# Patient Record
Sex: MALE | Race: WHITE | NOT HISPANIC OR LATINO | Employment: FULL TIME | URBAN - METROPOLITAN AREA
[De-identification: names, ages, dates, MRNs, and addresses within clinical notes are randomized per-mention and may not be internally consistent; named-entity substitution may affect disease eponyms.]

---

## 2023-01-19 ENCOUNTER — OFFICE VISIT (OUTPATIENT)
Dept: INTERNAL MEDICINE | Facility: CLINIC | Age: 58
End: 2023-01-19
Payer: COMMERCIAL

## 2023-01-19 VITALS
SYSTOLIC BLOOD PRESSURE: 148 MMHG | WEIGHT: 213.8 LBS | BODY MASS INDEX: 29.93 KG/M2 | DIASTOLIC BLOOD PRESSURE: 94 MMHG | HEIGHT: 71 IN | OXYGEN SATURATION: 97 % | TEMPERATURE: 98.4 F | HEART RATE: 98 BPM

## 2023-01-19 DIAGNOSIS — Z00.00 PE (PHYSICAL EXAM), ANNUAL: Primary | ICD-10-CM

## 2023-01-19 DIAGNOSIS — M54.16 LUMBAR RADICULOPATHY: ICD-10-CM

## 2023-01-19 DIAGNOSIS — Z12.5 SCREENING FOR PROSTATE CANCER: ICD-10-CM

## 2023-01-19 DIAGNOSIS — L30.9 DERMATITIS: ICD-10-CM

## 2023-01-19 DIAGNOSIS — L21.9 SEBORRHEIC DERMATITIS: ICD-10-CM

## 2023-01-19 DIAGNOSIS — Z91.018 ALLERGY TO ALPHA-GAL: ICD-10-CM

## 2023-01-19 DIAGNOSIS — Z11.59 SCREENING FOR VIRAL DISEASE: ICD-10-CM

## 2023-01-19 DIAGNOSIS — Z12.11 SCREENING FOR COLON CANCER: ICD-10-CM

## 2023-01-19 DIAGNOSIS — I10 ESSENTIAL HYPERTENSION: ICD-10-CM

## 2023-01-19 PROCEDURE — 99386 PREV VISIT NEW AGE 40-64: CPT | Performed by: NURSE PRACTITIONER

## 2023-01-19 RX ORDER — LOSARTAN POTASSIUM 50 MG/1
50 TABLET ORAL DAILY
Qty: 30 TABLET | Refills: 1 | Status: SHIPPED | OUTPATIENT
Start: 2023-01-19 | End: 2023-02-10

## 2023-01-20 LAB
ALBUMIN SERPL-MCNC: 4.5 G/DL (ref 3.5–5.2)
ALBUMIN/GLOB SERPL: 1.7 G/DL
ALP SERPL-CCNC: 49 U/L (ref 39–117)
ALT SERPL-CCNC: 16 U/L (ref 1–41)
APPEARANCE UR: CLEAR
AST SERPL-CCNC: 19 U/L (ref 1–40)
BILIRUB SERPL-MCNC: 0.5 MG/DL (ref 0–1.2)
BILIRUB UR QL STRIP: NEGATIVE
BUN SERPL-MCNC: 8 MG/DL (ref 6–20)
BUN/CREAT SERPL: 8.6 (ref 7–25)
CALCIUM SERPL-MCNC: 10 MG/DL (ref 8.6–10.5)
CHLORIDE SERPL-SCNC: 103 MMOL/L (ref 98–107)
CHOLEST SERPL-MCNC: 223 MG/DL (ref 0–200)
CO2 SERPL-SCNC: 27.9 MMOL/L (ref 22–29)
COLOR UR: YELLOW
CREAT SERPL-MCNC: 0.93 MG/DL (ref 0.76–1.27)
EGFRCR SERPLBLD CKD-EPI 2021: 96.4 ML/MIN/1.73
ERYTHROCYTE [DISTWIDTH] IN BLOOD BY AUTOMATED COUNT: 12.4 % (ref 12.3–15.4)
GLOBULIN SER CALC-MCNC: 2.6 GM/DL
GLUCOSE SERPL-MCNC: 90 MG/DL (ref 65–99)
GLUCOSE UR QL STRIP: NEGATIVE
HCT VFR BLD AUTO: 44.9 % (ref 37.5–51)
HCV AB S/CO SERPL IA: <0.1 S/CO RATIO (ref 0–0.9)
HDLC SERPL-MCNC: 61 MG/DL (ref 40–60)
HGB BLD-MCNC: 15.3 G/DL (ref 13–17.7)
HGB UR QL STRIP: NEGATIVE
KETONES UR QL STRIP: ABNORMAL
LDLC SERPL CALC-MCNC: 142 MG/DL (ref 0–100)
LEUKOCYTE ESTERASE UR QL STRIP: NEGATIVE
MCH RBC QN AUTO: 31.9 PG (ref 26.6–33)
MCHC RBC AUTO-ENTMCNC: 34.1 G/DL (ref 31.5–35.7)
MCV RBC AUTO: 93.5 FL (ref 79–97)
NITRITE UR QL STRIP: NEGATIVE
PH UR STRIP: 7.5 [PH] (ref 5–8)
PLATELET # BLD AUTO: 333 10*3/MM3 (ref 140–450)
POTASSIUM SERPL-SCNC: 4.7 MMOL/L (ref 3.5–5.2)
PROT SERPL-MCNC: 7.1 G/DL (ref 6–8.5)
PROT UR QL STRIP: NEGATIVE
PSA SERPL-MCNC: 0.39 NG/ML (ref 0–4)
RBC # BLD AUTO: 4.8 10*6/MM3 (ref 4.14–5.8)
SODIUM SERPL-SCNC: 140 MMOL/L (ref 136–145)
SP GR UR STRIP: 1.01 (ref 1–1.03)
TRIGL SERPL-MCNC: 115 MG/DL (ref 0–150)
TSH SERPL DL<=0.005 MIU/L-ACNC: 3.27 UIU/ML (ref 0.27–4.2)
UROBILINOGEN UR STRIP-MCNC: ABNORMAL MG/DL
VLDLC SERPL CALC-MCNC: 20 MG/DL (ref 5–40)
WBC # BLD AUTO: 4.54 10*3/MM3 (ref 3.4–10.8)

## 2023-01-23 ENCOUNTER — TELEPHONE (OUTPATIENT)
Dept: INTERNAL MEDICINE | Facility: CLINIC | Age: 58
End: 2023-01-23

## 2023-01-23 NOTE — TELEPHONE ENCOUNTER
Caller: Edvin Carmen    Relationship: Self    Best call back number: [phone]    What medication are you requesting: SHAMPOO AND CREAM FOR PSORIASIS      What are your current symptoms: PSORIASIS      If a prescription is needed, what is your preferred pharmacy and phone number: [id number] - [place], [place] - [address] AT [address]AR [facility] & [address] [facility] [phone] [facility] [phone] FX     Additional notes:    MEDICATION WAS DISCUSSED AT 1/19/2023 APPOINTMENT

## 2023-01-25 NOTE — TELEPHONE ENCOUNTER
PATIENT CALLED TO CHECK ON THIS REQUEST, HE IS GETTING READY TO GO OUT OF TOWN NEXT WEEK AND WANTS TO GET THIS TAKEN CARE OF THIS WEEK. STATES THIS WAS SUPPOSED TO BE PRESCRIBED AT HIS APPOINTMENT BUT WAS NEVER SENT TO THE PHARMACY.    PLEASE ADVISE    CALLBACK: [phone]

## 2023-01-26 ENCOUNTER — TELEPHONE (OUTPATIENT)
Dept: INTERNAL MEDICINE | Facility: CLINIC | Age: 58
End: 2023-01-26
Payer: COMMERCIAL

## 2023-01-26 RX ORDER — CLOBETASOL PROPIONATE 0.5 MG/G
1 OINTMENT TOPICAL 2 TIMES DAILY
Qty: 30 G | Refills: 0 | Status: SHIPPED | OUTPATIENT
Start: 2023-01-26 | End: 2023-03-22 | Stop reason: SDUPTHER

## 2023-01-27 RX ORDER — KETOCONAZOLE 20 MG/G
CREAM TOPICAL
Qty: 60 G | Refills: 0 | Status: SHIPPED | OUTPATIENT
Start: 2023-01-27 | End: 2023-03-22

## 2023-01-31 RX ORDER — KETOCONAZOLE 20 MG/ML
SHAMPOO TOPICAL
Qty: 120 ML | Refills: 1 | Status: SHIPPED | OUTPATIENT
Start: 2023-01-31 | End: 2023-03-22

## 2023-02-05 PROBLEM — Z91.018 ALLERGY TO ALPHA-GAL: Chronic | Status: ACTIVE | Noted: 2023-02-05

## 2023-02-05 PROBLEM — L21.9 SEBORRHEIC DERMATITIS: Chronic | Status: ACTIVE | Noted: 2023-02-05

## 2023-02-05 PROBLEM — L30.9 DERMATITIS: Status: ACTIVE | Noted: 2023-02-05

## 2023-02-05 PROBLEM — Z91.018 ALLERGY TO ALPHA-GAL: Status: ACTIVE | Noted: 2023-02-05

## 2023-02-05 PROBLEM — L21.9 SEBORRHEIC DERMATITIS: Status: ACTIVE | Noted: 2023-02-05

## 2023-02-05 PROBLEM — I10 ESSENTIAL HYPERTENSION: Chronic | Status: ACTIVE | Noted: 2023-01-19

## 2023-02-05 PROBLEM — Z86.79 HISTORY OF PERICARDITIS: Status: ACTIVE | Noted: 2023-02-05

## 2023-02-06 NOTE — PROGRESS NOTES
Subjective   Edvin Carmen is a 57 y.o. male who is here to establish care and for a physical exam.    History of Present Illness  Patient presents to establish care. Previous medical history discussed with patient in details and reflected in problem list.  He c/o a recurrent rash along his hairline and on his face which is mainly pruritic but not painful.  He has a hx of chronic neck pain, evaluated by neurosurgery (Dr. Stuart) in 2016 due to right radicular sx, MRI lumbar spine obtained in 2016.       The following portions of the patient's history were reviewed and updated as appropriate: allergies, current medications, past social history and problem list.    Past Medical History:   Diagnosis Date   • Anxiety    • History of medical problems 01/01/2018    Pericarditis   • Kidney stone 01/01/2013   • Pericarditis 06/2019         Current Outpatient Medications:   •  clobetasol (TEMOVATE) 0.05 % ointment, Apply 1 application topically to the appropriate area as directed 2 (Two) Times a Day., Disp: 30 g, Rfl: 0  •  ketoconazole (NIZORAL) 2 % cream, Apply to face daily, Disp: 60 g, Rfl: 0  •  ketoconazole (NIZORAL) 2 % shampoo, Apply to scalp 5 minutes before rinsing, apply 2-3 times per week, Disp: 120 mL, Rfl: 1  •  losartan (Cozaar) 50 MG tablet, Take 1 tablet by mouth Daily., Disp: 30 tablet, Rfl: 1    No Known Allergies    Review of Systems   Constitutional: Negative for activity change, appetite change, chills, diaphoresis, fatigue, fever and unexpected weight change.   HENT: Positive for congestion and postnasal drip. Negative for dental problem, drooling, ear discharge, ear pain, facial swelling, hearing loss, mouth sores, nosebleeds, rhinorrhea, sinus pressure, sore throat, tinnitus and trouble swallowing.    Eyes: Negative for photophobia, pain, discharge, redness, itching and visual disturbance.   Respiratory: Negative for apnea, cough, choking, chest tightness, shortness of breath and wheezing.   "  Cardiovascular: Negative for chest pain, palpitations and leg swelling.        No orthopnea, PND, MARQUIS   Gastrointestinal: Negative for abdominal pain, blood in stool, constipation, diarrhea, nausea and vomiting.   Endocrine: Negative for cold intolerance, heat intolerance, polydipsia and polyuria.   Genitourinary: Negative for decreased urine volume, dysuria, enuresis, flank pain, frequency, hematuria and urgency.   Musculoskeletal: Positive for arthralgias and back pain. Negative for gait problem, joint swelling, myalgias, neck pain and neck stiffness.   Skin: Positive for rash. Negative for color change.        No hair changes, no nail changes   Allergic/Immunologic: Negative for environmental allergies, food allergies and immunocompromised state.   Neurological: Negative for dizziness, tremors, seizures, syncope, speech difficulty, weakness, light-headedness, numbness and headaches.   Hematological: Negative for adenopathy. Does not bruise/bleed easily.   Psychiatric/Behavioral: Negative for agitation, confusion, decreased concentration, dysphoric mood, sleep disturbance and suicidal ideas. The patient is not nervous/anxious.        Objective   Vitals:    01/19/23 1133   BP: 148/94   BP Location: Left arm   Patient Position: Sitting   Cuff Size: Adult   Pulse: 98   Temp: 98.4 °F (36.9 °C)   TempSrc: Temporal   SpO2: 97%   Weight: 97 kg (213 lb 12.8 oz)   Height: 180.3 cm (71\")     Physical Exam  Constitutional:       General: He is not in acute distress.     Appearance: Normal appearance. He is not diaphoretic.   HENT:      Head: Normocephalic and atraumatic.      Right Ear: Tympanic membrane, ear canal and external ear normal.      Left Ear: Tympanic membrane, ear canal and external ear normal.      Nose: Nose normal. No rhinorrhea.      Mouth/Throat:      Mouth: Mucous membranes are moist.      Pharynx: Oropharynx is clear.   Eyes:      General:         Right eye: No discharge.         Left eye: No discharge. "      Conjunctiva/sclera: Conjunctivae normal.   Cardiovascular:      Rate and Rhythm: Normal rate and regular rhythm.      Pulses: Normal pulses.      Heart sounds: Normal heart sounds.   Pulmonary:      Effort: Pulmonary effort is normal.      Breath sounds: Normal breath sounds.   Abdominal:      General: Bowel sounds are normal.      Tenderness: There is no abdominal tenderness.   Musculoskeletal:         General: No swelling or tenderness.      Cervical back: Normal range of motion.   Skin:     General: Skin is warm and dry.      Findings: Rash present.      Comments: Erythematous, scaly rash with plaques only hairline and throughout face   Neurological:      General: No focal deficit present.      Mental Status: He is alert and oriented to person, place, and time.   Psychiatric:         Mood and Affect: Mood normal.         Behavior: Behavior normal.         Judgment: Judgment normal.         Assessment & Plan   Diagnoses and all orders for this visit:    1. PE (physical exam), annual (Primary)  -     CBC (No Diff)  -     Comprehensive Metabolic Panel  -     Lipid Panel  -     TSH  -     Urinalysis With Microscopic If Indicated (No Culture) - Urine, Clean Catch    2. Essential hypertension  Assessment & Plan:  His BP is elevated in the office and has been consistently >140/90 over the past few weeks; begin losartan and continue to monitor BP.    Orders:  -     losartan (Cozaar) 50 MG tablet; Take 1 tablet by mouth Daily.  Dispense: 30 tablet; Refill: 1    3. Dermatitis  Assessment & Plan:  Rash along hairline due to scalp psoriasis vs. Seborrheic dermatitis, will treat with topical steroids and antifungals and re-evaluate at f/u appt (will need derm referral if sx do not improve which we discussed today, declined for now).    Orders:  -     clobetasol (TEMOVATE) 0.05 % ointment; Apply 1 application topically to the appropriate area as directed 2 (Two) Times a Day.  Dispense: 30 g; Refill: 0    4. Seborrheic  dermatitis  Assessment & Plan:  He is given a topical shampoo and cream to apply, re-evaluate at f/u appt.    Orders:  -     Discontinue: Ketoconazole 1 % shampoo; Apply to scalp 5 minutes before rinsing, apply 2-3 times per week  Dispense: 200 mL; Refill: 0  -     ketoconazole (NIZORAL) 2 % cream; Apply to face daily  Dispense: 60 g; Refill: 0  -     ketoconazole (NIZORAL) 2 % shampoo; Apply to scalp 5 minutes before rinsing, apply 2-3 times per week  Dispense: 120 mL; Refill: 1    5. Allergy to alpha-gal  Assessment & Plan:  Sx began dominique 18 months ago, controlled with diet; continue to monitor.      6. Lumbar radiculopathy  Assessment & Plan:  2016: MRI lumbar spine-+ bulging disc on the right side at L3-4, sx improved and are stable      7. Screening for prostate cancer  -     PSA Screen    8. Screening for colon cancer  -     Cologuard - Stool, Per Rectum; Future    9. Screening for viral disease  -     Cancel: Hepatitis C Antibody    Other orders  -     Hepatitis C Antibody      Risk assessment:  He has a family hx (mother and father) of CAD-check fasting labs today.  His Body mass index is 29.82 kg/m². - He remains active and tries to follow a low-fat, low-cholesterol diet.    Prevention:  Health Maintenance   Topic Date Due   • COLORECTAL CANCER SCREENING  Never done   • TDAP/TD VACCINES (1 - Tdap) Never done   • ZOSTER VACCINE (1 of 2) Never done   • COVID-19 Vaccine (4 - Booster for Pfizer series) 01/11/2022   • INFLUENZA VACCINE  Never done   • ANNUAL PHYSICAL  Never done   • HEPATITIS C SCREENING  Completed   • Pneumococcal Vaccine 0-64  Aged Out       Discussed healthy lifestyle choices such as maintaining a balanced diet low in carbohydrates and limiting caffeine and alcohol intake.  Recommended routine exercise for bone strength and cardiovascular health.

## 2023-02-06 NOTE — ASSESSMENT & PLAN NOTE
His BP is elevated in the office and has been consistently >140/90 over the past few weeks; begin losartan and continue to monitor BP.

## 2023-02-06 NOTE — ASSESSMENT & PLAN NOTE
Rash along hairline due to scalp psoriasis vs. Seborrheic dermatitis, will treat with topical steroids and antifungals and re-evaluate at f/u appt (will need derm referral if sx do not improve which we discussed today, declined for now).

## 2023-02-10 DIAGNOSIS — I10 ESSENTIAL HYPERTENSION: ICD-10-CM

## 2023-02-10 RX ORDER — LOSARTAN POTASSIUM 50 MG/1
TABLET ORAL
Qty: 30 TABLET | Refills: 1 | Status: SHIPPED | OUTPATIENT
Start: 2023-02-10 | End: 2023-03-22 | Stop reason: SDUPTHER

## 2023-03-09 DIAGNOSIS — I10 ESSENTIAL HYPERTENSION: ICD-10-CM

## 2023-03-09 RX ORDER — LOSARTAN POTASSIUM 50 MG/1
TABLET ORAL
Qty: 30 TABLET | Refills: 1 | OUTPATIENT
Start: 2023-03-09

## 2023-03-22 ENCOUNTER — OFFICE VISIT (OUTPATIENT)
Dept: INTERNAL MEDICINE | Facility: CLINIC | Age: 58
End: 2023-03-22
Payer: COMMERCIAL

## 2023-03-22 VITALS
HEART RATE: 101 BPM | HEIGHT: 71 IN | WEIGHT: 220.4 LBS | DIASTOLIC BLOOD PRESSURE: 90 MMHG | BODY MASS INDEX: 30.85 KG/M2 | TEMPERATURE: 98 F | SYSTOLIC BLOOD PRESSURE: 132 MMHG | OXYGEN SATURATION: 96 %

## 2023-03-22 DIAGNOSIS — I10 ESSENTIAL HYPERTENSION: Primary | ICD-10-CM

## 2023-03-22 DIAGNOSIS — L30.9 DERMATITIS: ICD-10-CM

## 2023-03-22 DIAGNOSIS — F41.9 ANXIETY: ICD-10-CM

## 2023-03-22 DIAGNOSIS — Z12.11 SCREENING FOR COLON CANCER: ICD-10-CM

## 2023-03-22 PROBLEM — L21.9 SEBORRHEIC DERMATITIS: Chronic | Status: RESOLVED | Noted: 2023-02-05 | Resolved: 2023-03-22

## 2023-03-22 PROCEDURE — 99214 OFFICE O/P EST MOD 30 MIN: CPT | Performed by: NURSE PRACTITIONER

## 2023-03-22 RX ORDER — SODIUM FLUORIDE 6 MG/ML
PASTE, DENTIFRICE DENTAL
COMMUNITY
Start: 2023-03-13

## 2023-03-22 RX ORDER — CLOBETASOL PROPIONATE 0.5 MG/G
1 OINTMENT TOPICAL 2 TIMES DAILY
Qty: 30 G | Refills: 0 | Status: SHIPPED | OUTPATIENT
Start: 2023-03-22

## 2023-03-22 RX ORDER — ESCITALOPRAM OXALATE 10 MG/1
10 TABLET ORAL DAILY
Qty: 30 TABLET | Refills: 1 | Status: SHIPPED | OUTPATIENT
Start: 2023-03-22 | End: 2023-04-19

## 2023-03-22 RX ORDER — LOSARTAN POTASSIUM 100 MG/1
100 TABLET ORAL DAILY
Qty: 90 TABLET | Refills: 1 | Status: SHIPPED | OUTPATIENT
Start: 2023-03-22

## 2023-04-09 PROBLEM — F41.9 ANXIETY: Chronic | Status: ACTIVE | Noted: 2023-04-09

## 2023-04-09 PROBLEM — F41.9 ANXIETY: Status: ACTIVE | Noted: 2023-04-09

## 2023-04-09 NOTE — PROGRESS NOTES
"Chief Complaint  Hypertension (2 month follow up), Anxiety, and Rash    Subjective        Edvin Carmen presents to [facility] for f/u regarding HTN, rash and anxiety.    History of Present Illness  He was started on losartan at his last visit which he is tolerating well. His BP has improved but remains mildly elevated (unsure what home readings have been).  His facial and scalp rash has improved with topical steroid cream but is recurrent.  He is also tolerating Escitalopram daily which has been helpful for mgmt of anxiety.      Objective   Vital Signs:  /90 (BP Location: Left arm, Patient Position: Sitting, Cuff Size: Adult)   Pulse 101   Temp 98 °F (36.7 °C) (Temporal)   Ht 180.3 cm (71\")   Wt 100 kg (220 lb 6.4 oz)   SpO2 96%   BMI 30.74 kg/m²   Estimated body mass index is 30.74 kg/m² as calculated from the following:    Height as of this encounter: 180.3 cm (71\").    Weight as of this encounter: 100 kg (220 lb 6.4 oz).             Physical Exam  Constitutional:       Appearance: He is well-developed. He is not ill-appearing.   HENT:      Head: Normocephalic.      Right Ear: Hearing, tympanic membrane and external ear normal.      Left Ear: Hearing, tympanic membrane and external ear normal.      Nose: Nose normal. No nasal deformity, mucosal edema or rhinorrhea.      Right Sinus: No maxillary sinus tenderness or frontal sinus tenderness.      Left Sinus: No maxillary sinus tenderness or frontal sinus tenderness.      Mouth/Throat:      Dentition: Normal dentition.   Eyes:      General: Lids are normal.         Right eye: No discharge.         Left eye: No discharge.      Conjunctiva/sclera: Conjunctivae normal.      Right eye: No exudate.     Left eye: No exudate.  Neck:      Thyroid: No thyroid mass or thyromegaly.      Vascular: No carotid bruit.      Trachea: Trachea normal.   Cardiovascular:      Rate and Rhythm: Regular rhythm.      Pulses: Normal pulses.      " Heart sounds: Normal heart sounds. No murmur heard.  Pulmonary:      Effort: No respiratory distress.      Breath sounds: Normal breath sounds. No decreased breath sounds, wheezing, rhonchi or rales.   Abdominal:      General: Bowel sounds are normal.      Palpations: Abdomen is soft.      Tenderness: There is no abdominal tenderness.   Musculoskeletal:      Cervical back: Normal range of motion. No edema.   Lymphadenopathy:      Head:      Right side of head: No submental, submandibular, tonsillar, preauricular, posterior auricular or occipital adenopathy.      Left side of head: No submental, submandibular, tonsillar, preauricular, posterior auricular or occipital adenopathy.   Skin:     General: Skin is warm and dry.      Findings: Rash present.      Nails: There is no clubbing.      Comments: There is an erythematous plaque along hairline and scalp   Neurological:      Mental Status: He is alert.   Psychiatric:         Behavior: Behavior is cooperative.        Result Review :  The following data was reviewed by: EMILY Kwok on 03/22/2023:  Common labs    Common Labs 1/19/23 1/19/23 1/19/23 1/19/23    1242 1242 1242 1242   Glucose  90     BUN  8     Creatinine  0.93     Sodium  140     Potassium  4.7     Chloride  103     Calcium  10.0     Total Protein  7.1     Albumin  4.5     Total Bilirubin  0.5     Alkaline Phosphatase  49     AST (SGOT)  19     ALT (SGPT)  16     WBC 4.54      Hemoglobin 15.3      Hematocrit 44.9      Platelets 333      Total Cholesterol   223 (A)    Triglycerides   115    HDL Cholesterol   61 (A)    LDL Cholesterol    142 (A)    PSA    0.389   (A) Abnormal value       Comments are available for some flowsheets but are not being displayed.                        Assessment and Plan   Diagnoses and all orders for this visit:    1. Essential hypertension (Primary)  Assessment & Plan:  His BP is improving but remains above goal of <135/85, will increase losartan from 50 mg to 100 mg  and continue to monitor.    Orders:  -     losartan (COZAAR) 100 MG tablet; Take 1 tablet by mouth Daily.  Dispense: 90 tablet; Refill: 1    2. Dermatitis  Assessment & Plan:  Rash is suggestive of psoriasis which has improved with topical cream but will refer to derm for further mgmt    Orders:  -     Ambulatory Referral to Dermatology  -     clobetasol (TEMOVATE) 0.05 % ointment; Apply 1 application topically to the appropriate area as directed 2 (Two) Times a Day.  Dispense: 30 g; Refill: 0    3. Anxiety  Assessment & Plan:  He is doing well with Lexapro daily which he will continue along with close monitoring.    Orders:  -     escitalopram (LEXAPRO) 10 MG tablet; Take 1 tablet by mouth Daily.  Dispense: 30 tablet; Refill: 1    4. Screening for colon cancer  -     Cologuard - Stool, Per Rectum; Future         Follow Up   Return in about 4 months (around 7/22/2023).  Patient was given instructions and counseling regarding his condition or for health maintenance advice. Please see specific information pulled into the AVS if appropriate.

## 2023-04-09 NOTE — ASSESSMENT & PLAN NOTE
His BP is improving but remains above goal of <135/85, will increase losartan from 50 mg to 100 mg and continue to monitor.

## 2023-04-09 NOTE — ASSESSMENT & PLAN NOTE
Rash is suggestive of psoriasis which has improved with topical cream but will refer to derm for further mgmt

## 2023-04-14 ENCOUNTER — TELEPHONE (OUTPATIENT)
Dept: INTERNAL MEDICINE | Facility: CLINIC | Age: 58
End: 2023-04-14

## 2023-04-14 NOTE — TELEPHONE ENCOUNTER
Caller: Catherine Pardo    Relationship: Emergency Contact    Best call back number:     What is the best time to reach you:     Who are you requesting to speak with (clinical staff, provider,  specific staff member):     Do you know the name of the person who called:     What was the call regarding: PATIENTS GUIDO KHALIL IS CALLING IN TO INFORM DR LANE THAT THAT PATIENT HAS BEEN HAVING PAIN AND SWELLING IN HIS LEFT FOOT FOR 10 DAYS NOW AND SHE WANTS TO KNOW IF DR LANE WANTS TO SEE THE PATIENT OF IF HE WILL NEED TO BE REFERRED TO SEE SOMEONE ELSE.     Do you require a callback: YES

## 2023-04-19 ENCOUNTER — OFFICE VISIT (OUTPATIENT)
Dept: INTERNAL MEDICINE | Facility: CLINIC | Age: 58
End: 2023-04-19
Payer: COMMERCIAL

## 2023-04-19 VITALS
TEMPERATURE: 97.9 F | HEIGHT: 71 IN | OXYGEN SATURATION: 93 % | SYSTOLIC BLOOD PRESSURE: 133 MMHG | BODY MASS INDEX: 30.18 KG/M2 | WEIGHT: 215.6 LBS | HEART RATE: 106 BPM | DIASTOLIC BLOOD PRESSURE: 88 MMHG

## 2023-04-19 DIAGNOSIS — I10 ESSENTIAL HYPERTENSION: Chronic | ICD-10-CM

## 2023-04-19 DIAGNOSIS — M79.672 LEFT FOOT PAIN: Primary | ICD-10-CM

## 2023-04-19 PROCEDURE — 99213 OFFICE O/P EST LOW 20 MIN: CPT | Performed by: NURSE PRACTITIONER

## 2023-04-20 ENCOUNTER — HOSPITAL ENCOUNTER (OUTPATIENT)
Dept: GENERAL RADIOLOGY | Facility: HOSPITAL | Age: 58
Discharge: HOME | End: 2023-04-20
Payer: COMMERCIAL

## 2023-04-20 PROCEDURE — 73630 X-RAY EXAM OF FOOT: CPT

## 2023-05-04 ENCOUNTER — TELEPHONE (OUTPATIENT)
Dept: INTERNAL MEDICINE | Facility: CLINIC | Age: 58
End: 2023-05-04

## 2023-05-04 NOTE — TELEPHONE ENCOUNTER
Caller: Edvin Carmen    Relationship: Self    Best call back number:     What is the best time to reach you:     Who are you requesting to speak with (clinical staff, provider,  specific staff member): DR LANE    Do you know the name of the person who called:     What was the call regarding: PATIENT THINKS HE HAS A STRESS FRACTURE IN HIS LEFT FOOT AND HE WANTS TO BE CALLED TO DISCUSS.    Do you require a callback: YES

## 2023-05-08 PROBLEM — M79.672 LEFT FOOT PAIN: Status: ACTIVE | Noted: 2023-05-08

## 2023-05-08 RX ORDER — CLOBETASOL PROPIONATE 0.46 MG/ML
SOLUTION TOPICAL
COMMUNITY
Start: 2023-05-02

## 2023-05-08 NOTE — ASSESSMENT & PLAN NOTE
Losartan dose increased at 3/22/23 visit, home BP has improved-slightly elevated today. Continue medication along with a low sodium diet and close monitoring.

## 2023-05-08 NOTE — PROGRESS NOTES
"Chief Complaint  Foot Swelling    Subjective        Edvin Carmen presents to [facility] due to left foot pain.    History of Present Illness  He presents due to left midfoot pain which began approximately 2 weeks ago.  He denies acute injury or trauma.  He states pain is worse with weightbearing activities and notes swelling at the end of the day.  He notes increased pain as he walks, pushing up on his toes.  Denies paresthesias.  He has tried to rest the area with little improvement in symptoms.  He did not start Lexapro as prescribed at his last visit.      Objective   Vital Signs:  /88 (BP Location: Left arm, Patient Position: Sitting, Cuff Size: Adult)   Pulse 106   Temp 97.9 °F (36.6 °C) (Oral)   Ht 180.3 cm (71\")   Wt 97.8 kg (215 lb 9.6 oz)   SpO2 93%   BMI 30.07 kg/m²   Estimated body mass index is 30.07 kg/m² as calculated from the following:    Height as of this encounter: 180.3 cm (71\").    Weight as of this encounter: 97.8 kg (215 lb 9.6 oz).             Physical Exam  Constitutional:       Appearance: He is well-developed. He is not ill-appearing.   HENT:      Head: Normocephalic.      Right Ear: Hearing, tympanic membrane and external ear normal.      Left Ear: Hearing, tympanic membrane and external ear normal.      Nose: Nose normal. No nasal deformity, mucosal edema or rhinorrhea.      Right Sinus: No maxillary sinus tenderness or frontal sinus tenderness.      Left Sinus: No maxillary sinus tenderness or frontal sinus tenderness.      Mouth/Throat:      Dentition: Normal dentition.   Eyes:      General: Lids are normal.         Right eye: No discharge.         Left eye: No discharge.      Conjunctiva/sclera: Conjunctivae normal.      Right eye: No exudate.     Left eye: No exudate.  Neck:      Thyroid: No thyroid mass or thyromegaly.      Vascular: No carotid bruit.      Trachea: Trachea normal.   Cardiovascular:      Rate and Rhythm: Regular rhythm.    "   Pulses: Normal pulses.      Heart sounds: Normal heart sounds. No murmur heard.  Pulmonary:      Effort: No respiratory distress.      Breath sounds: Normal breath sounds. No decreased breath sounds, wheezing, rhonchi or rales.   Abdominal:      General: Bowel sounds are normal.      Palpations: Abdomen is soft.      Tenderness: There is no abdominal tenderness.   Musculoskeletal:      Cervical back: Normal range of motion. No edema.   Feet:      Left foot:      Skin integrity: No erythema.      Comments: There is tenderness to palpitation of left midfoot with minimal swelling, no erythema  Lymphadenopathy:      Head:      Right side of head: No submental, submandibular, tonsillar, preauricular, posterior auricular or occipital adenopathy.      Left side of head: No submental, submandibular, tonsillar, preauricular, posterior auricular or occipital adenopathy.   Skin:     General: Skin is warm and dry.      Nails: There is no clubbing.   Neurological:      Mental Status: He is alert.   Psychiatric:         Behavior: Behavior is cooperative.        Result Review :  The following data was reviewed by: EMILY Kwok on 04/19/2023:  Common labs        1/19/2023    12:42   Common Labs   Glucose 90     BUN 8     Creatinine 0.93     Sodium 140     Potassium 4.7     Chloride 103     Calcium 10.0     Total Protein 7.1     Albumin 4.5     Total Bilirubin 0.5     Alkaline Phosphatase 49     AST (SGOT) 19     ALT (SGPT) 16     WBC 4.54     Hemoglobin 15.3     Hematocrit 44.9     Platelets 333     Total Cholesterol 223     Triglycerides 115     HDL Cholesterol 61     LDL Cholesterol  142     PSA 0.389                    Assessment and Plan   Diagnoses and all orders for this visit:    1. Left foot pain (Primary)  Assessment & Plan:  No evidence of gout noted on exam, sx due to stress fracture? Will obtain xray and in the meantime begin Ibuprofen as needed for pain and inflammation.    Orders:  -     XR Foot 3+ View  Left    2. Essential hypertension  Assessment & Plan:  Losartan dose increased at 3/22/23 visit, home BP has improved-slightly elevated today. Continue medication along with a low sodium diet and close monitoring.             Follow Up   Return if symptoms worsen or fail to improve, for Next scheduled follow up.  Patient was given instructions and counseling regarding his condition or for health maintenance advice. Please see specific information pulled into the AVS if appropriate.

## 2023-05-18 DIAGNOSIS — Z12.11 SCREENING FOR COLON CANCER: ICD-10-CM

## 2023-09-06 DIAGNOSIS — I10 ESSENTIAL HYPERTENSION: ICD-10-CM

## 2023-09-07 RX ORDER — LOSARTAN POTASSIUM 100 MG/1
TABLET ORAL
Qty: 90 TABLET | Refills: 1 | Status: SHIPPED | OUTPATIENT
Start: 2023-09-07

## 2023-10-08 ENCOUNTER — HOSPITAL ENCOUNTER (EMERGENCY)
Facility: HOSPITAL | Age: 58
Discharge: HOME | End: 2023-10-08
Attending: EMERGENCY MEDICINE | Admitting: EMERGENCY MEDICINE
Payer: COMMERCIAL

## 2023-10-08 ENCOUNTER — APPOINTMENT (OUTPATIENT)
Dept: CT IMAGING | Facility: HOSPITAL | Age: 58
End: 2023-10-08
Payer: COMMERCIAL

## 2023-10-08 VITALS
BODY MASS INDEX: 30.78 KG/M2 | TEMPERATURE: 97.7 F | HEIGHT: 70 IN | RESPIRATION RATE: 15 BRPM | HEART RATE: 82 BPM | DIASTOLIC BLOOD PRESSURE: 111 MMHG | WEIGHT: 215 LBS | SYSTOLIC BLOOD PRESSURE: 157 MMHG | OXYGEN SATURATION: 94 %

## 2023-10-08 DIAGNOSIS — M54.41 ACUTE RIGHT-SIDED LOW BACK PAIN WITH RIGHT-SIDED SCIATICA: Primary | ICD-10-CM

## 2023-10-08 DIAGNOSIS — M51.36 LUMBAR DEGENERATIVE DISC DISEASE: ICD-10-CM

## 2023-10-08 PROCEDURE — 96372 THER/PROPH/DIAG INJ SC/IM: CPT

## 2023-10-08 PROCEDURE — 25010000002 HYDROMORPHONE 1 MG/ML SOLUTION: Performed by: EMERGENCY MEDICINE

## 2023-10-08 PROCEDURE — 63710000001 PREDNISONE PER 1 MG: Performed by: EMERGENCY MEDICINE

## 2023-10-08 PROCEDURE — 99284 EMERGENCY DEPT VISIT MOD MDM: CPT

## 2023-10-08 PROCEDURE — 72131 CT LUMBAR SPINE W/O DYE: CPT

## 2023-10-08 RX ORDER — METAXALONE 800 MG/1
800 TABLET ORAL 3 TIMES DAILY
Qty: 15 TABLET | Refills: 0 | Status: SHIPPED | OUTPATIENT
Start: 2023-10-08

## 2023-10-08 RX ORDER — METHYLPREDNISOLONE 4 MG/1
TABLET ORAL
Qty: 21 EACH | Refills: 0 | Status: SHIPPED | OUTPATIENT
Start: 2023-10-08

## 2023-10-08 RX ORDER — PREDNISONE 20 MG/1
60 TABLET ORAL ONCE
Status: COMPLETED | OUTPATIENT
Start: 2023-10-08 | End: 2023-10-08

## 2023-10-08 RX ORDER — NAPROXEN 500 MG/1
500 TABLET ORAL 2 TIMES DAILY PRN
Qty: 20 TABLET | Refills: 0 | Status: SHIPPED | OUTPATIENT
Start: 2023-10-08

## 2023-10-08 RX ORDER — HYDROCODONE BITARTRATE AND ACETAMINOPHEN 5; 325 MG/1; MG/1
1-2 TABLET ORAL EVERY 6 HOURS PRN
Qty: 15 TABLET | Refills: 0 | Status: SHIPPED | OUTPATIENT
Start: 2023-10-08 | End: 2023-10-13 | Stop reason: SDUPTHER

## 2023-10-08 RX ORDER — CYCLOBENZAPRINE HCL 10 MG
10 TABLET ORAL ONCE
Status: COMPLETED | OUTPATIENT
Start: 2023-10-08 | End: 2023-10-08

## 2023-10-08 RX ORDER — HYDROCODONE BITARTRATE AND ACETAMINOPHEN 7.5; 325 MG/1; MG/1
1 TABLET ORAL ONCE
Status: COMPLETED | OUTPATIENT
Start: 2023-10-08 | End: 2023-10-08

## 2023-10-08 RX ADMIN — HYDROMORPHONE HYDROCHLORIDE 1 MG: 1 INJECTION, SOLUTION INTRAMUSCULAR; INTRAVENOUS; SUBCUTANEOUS at 15:36

## 2023-10-08 RX ADMIN — CYCLOBENZAPRINE 10 MG: 10 TABLET, FILM COATED ORAL at 15:38

## 2023-10-08 RX ADMIN — PREDNISONE 60 MG: 20 TABLET ORAL at 15:39

## 2023-10-08 RX ADMIN — HYDROCODONE BITARTRATE AND ACETAMINOPHEN 1 TABLET: 7.5; 325 TABLET ORAL at 17:36

## 2023-10-08 NOTE — DISCHARGE INSTRUCTIONS
Take medications as prescribed.  Follow-up with your primary care provider soon as possible.  If symptoms persist, you may need an MRI of your lumbar spine.  Return to the emergency department for worsening pain, numbness/tingling/weakness in legs, loss of bowel/bladder control, numbness in your groin/pelvic area, fever, or other concern.

## 2023-10-08 NOTE — ED PROVIDER NOTES
EMERGENCY DEPARTMENT ENCOUNTER    Room Number:  24/24  PCP: Marylu Gerber APRN  Historian: Patient, spouse, EMS      HPI:  Chief Complaint: Low back pain  A complete HPI/ROS/PMH/PSH/SH/FH are unobtainable due to: Nothing  Context: Edvin Carmen is a 57 y.o. male who presents to the ED from home by EMS c/o right lower back pain.  Pain began gradually several days ago.  Pain has been worse since last night.  Pain radiates into the anterior right thigh.  Pain is worse with movement and bending.  Patient has been moving recently and has been carrying a lot of boxes.  However, he does not recall any specific injury.  Denies leg weakness, loss of bowel/bladder control, saddle anesthesia, or prior back surgery.  He had a similar episode of back pain several years ago.  Denies recent illness, cough, fever, chest pain, shortness of breath, flank pain, dysuria, or hematuria.            PAST MEDICAL HISTORY  Active Ambulatory Problems     Diagnosis Date Noted    Lumbar radiculopathy 10/20/2016    Essential hypertension 01/19/2023    Dermatitis 02/05/2023    Allergy to alpha-gal 02/05/2023    History of pericarditis 02/05/2023    Anxiety 04/09/2023    Left foot pain 05/08/2023     Resolved Ambulatory Problems     Diagnosis Date Noted    Seborrheic dermatitis 02/05/2023     Past Medical History:   Diagnosis Date    History of medical problems 01/01/2018    Kidney stone 01/01/2013    Pericarditis 06/2019         PAST SURGICAL HISTORY  History reviewed. No pertinent surgical history.      FAMILY HISTORY  Family History   Problem Relation Age of Onset    Heart attack Mother 78    Dementia Mother     Heart disease Mother     Coronary artery disease Father 70        +smoker    Heart disease Father          SOCIAL HISTORY  Social History     Socioeconomic History    Marital status:     Number of children: 2    Years of education: PTFA   Tobacco Use    Smoking status: Some Days     Packs/day: 0.00     Years: 0.00      Additional pack years: 0.00     Total pack years: 0.00     Types: Cigarettes    Smokeless tobacco: Never   Substance and Sexual Activity    Alcohol use: Not Currently    Drug use: No    Sexual activity: Yes     Partners: Female     Birth control/protection: I.U.D.         ALLERGIES  Patient has no known allergies.    REVIEW OF SYSTEMS  All systems have been reviewed and are negative except for those listed in the HPI      PHYSICAL EXAM  ED Triage Vitals [10/08/23 1318]   Temp Heart Rate Resp BP SpO2   97.7 øF (36.5 øC) 62 16 (!) 166/102 96 %      Temp src Heart Rate Source Patient Position BP Location FiO2 (%)   -- -- -- -- --       Physical Exam      GENERAL: Awake, alert, oriented x3.  Well-developed, well-nourished male.  No acute distress  HENT: NCAT, nares patent  EYES: no scleral icterus  CV: regular rhythm, normal rate, equal pedal pulses bilaterally  RESPIRATORY: normal effort, clear to auscultation bilaterally  ABDOMEN: soft, nondistended, nontender  MUSCULOSKELETAL: There is tenderness over the right SI joint and right lumbar paraspinal muscles.  T and L-spine are nontender.  Extremities are nontender with full range of motion  NEURO: Speech is normal.  No facial droop.  Normal and equal strength with bilateral hip flexion/extension, knee flexion/extension, ankle dorsiflexion/plantarflexion, and extension of the great toes.  Normal light touch sensation in all extremities.  No saddle anesthesia.  Straight leg raise negative bilaterally.  PSYCH:  calm, cooperative  SKIN: warm, dry    Vital signs and nursing notes reviewed.          LAB RESULTS  No results found for this or any previous visit (from the past 24 hour(s)).    Ordered the above labs and reviewed the results.        RADIOLOGY  CT Lumbar Spine Without Contrast    Result Date: 10/8/2023  CT LUMBAR SPINE WITHOUT CONTRAST  HISTORY: Back pain, right radiculopathy.  COMPARISON: None.  FINDINGS: There is moderate-to-severe loss of disc height at L5-S1.  Vacuum disc effect is noted at L4-L5. Small Schmorl's nodes are appreciated involving the superior endplates from T12 to L2. There is no evidence of fracture.  L1-L2: Mild broad-based disc osteophyte complex is present with no evidence of herniation.  L2-L3: Mild central broad-based disc osteophyte complex is present with no evidence of herniation.  L3-L4: There is mild-to-moderate canal stenosis secondary to a broad-based central disc osteophyte complex which also contributes to lateral recess narrowing bilaterally and to mild and mild-to-moderate foraminal stenosis on the left and right respectively.  L4-L5: There is mild canal stenosis and lateral recess narrowing bilaterally secondary to a broad-based disc osteophyte complex. Mild foraminal stenosis is present bilaterally secondary to extension of a small disc osteophyte complex into the neural foramen.  L5-S1: Mild central disc osteophyte complex is present with no evidence of herniation. There is mild and moderate foraminal stenosis on the right and left respectively. Transitional anatomy is appreciated consisting of partial sacralization of L5.      1.  Transitional anatomy is appreciated consisting of partial sacralization of L5. Careful correlation prior to any intervention is required given the presence of transitional anatomy. 2.  Multilevel degenerative disease involving the lumbar spine is noted including mild-to-moderate canal stenosis at L3-L4. There is lateral recess narrowing bilaterally at L3-L4 which potentially may involve the traversing L4 nerve roots. Spinal stenosis and lateral recess narrowing at L4-L5 is slightly less prominent. Multilevel foraminal stenosis is appreciated as described above most prominent of which is at L5-S1 where there is moderate foraminal stenosis on the left secondary to loss of disc height and extension of disc osteophyte complex into the neural foramen. Small Schmorl's nodes are appreciated. Further evaluation could  be performed with a MRI examination of the lumbar spine as indicated.    Radiation dose reduction techniques were utilized, including automated exposure control and exposure modulation based on body size.   This report was finalized on 10/8/2023 4:17 PM by Dr. Aung Escudero M.D on Workstation: [employer]       Ordered the above noted radiological studies. Reviewed by me in PACS.            PROCEDURES  Procedures              MEDICATIONS GIVEN IN ER  Medications   predniSONE (DELTASONE) tablet 60 mg (60 mg Oral Given 10/8/23 1539)   cyclobenzaprine (FLEXERIL) tablet 10 mg (10 mg Oral Given 10/8/23 1538)   HYDROmorphone (DILAUDID) injection 1 mg (1 mg Intramuscular Given 10/8/23 1536)   HYDROcodone-acetaminophen (NORCO) 7.5-325 MG per tablet 1 tablet (1 tablet Oral Given 10/8/23 7946)                   MEDICAL DECISION MAKING, PROGRESS, and CONSULTS    All labs have been independently reviewed by me.  All radiology studies have been reviewed by me and I have also reviewed the radiology report.   EKG's independently viewed and interpreted by me.  Discussion below represents my analysis of pertinent findings related to patient's condition, differential diagnosis, treatment plan and final disposition.      Additional sources:  - Discussed/ obtained information from independent historians: Spouse, EMS    - External (non-ED) record review: Patient saw his PCP in April 2023 for left foot pain and swelling.  He was admitted to [facility] in April 2019 for chest pain and acute pericarditis.    - Chronic or social conditions impacting care: None          Orders placed during this visit:  Orders Placed This Encounter   Procedures    CT Lumbar Spine Without Contrast         Additional orders considered but not ordered:  MRI of the lumbar spine: Patient does not have any neurological deficits.  Pain improved with p.o./IM medications.  Have low suspicion for cauda equina syndrome        Differential  diagnosis:  Sciatica, lumbar radiculopathy, degenerative disc disease, cauda equina syndrome, lumbar strain        Independent interpretation of labs, radiology studies, and discussions with consultants:  ED Course as of 10/08/23 1826   Sun Oct 08, 2023   1354 CT lumbar spine personally interpreted by me.  My personal interpretation is: Normal alignment.  No compression deformity.  Per the radiologist, there is multilevel degenerative disease.  There is moderate canal stenosis at L3/4.  There is lateral recess narrowing bilaterally at L3/4 which may involve the traversing L4 nerve roots.  There is multilevel foraminal stenosis most prominent at L5/S1 where there is moderate foraminal stenosis on the left.  See dictated report for details. [WH]   1640 Test results discussed with the patient and his wife.  He is feeling better after prednisone, Flexeril, and IM Dilaudid.  He is able to stand and ambulate without assistance.  He will be discharged with prescriptions for Medrol Dosepak, Skelaxin, Naprosyn, and a short course of pain medication.  He was advised to follow-up with his PCP and the possibility of needing an MRI if his symptoms persist.  Return precautions were discussed. [WH]   1645 [facility] query complete. Treatment plan to include limited course of prescribed  controlled substance. Risks including addiction, benefits, and alternatives presented to patient.    [WH]   1701 MDM: Patient presented to ED complaint of right lower back pain radiating into his right thigh.  Neuro exam was normal.  CT scan showed multilevel degenerative changes with foraminal stenosis and spinal stenosis.  His pain improved with prednisone, Flexeril, and Dilaudid.  Plan is for symptomatic treatment with NSAIDs, muscle relaxer, Medrol Dosepak, and a short course of pain medication.  Patient may ultimately need an MRI if his symptoms persist. [WH]      ED Course User Index  [WH] Jean Vieira MD               DIAGNOSIS  Final  diagnoses:   Acute right-sided low back pain with right-sided sciatica   Lumbar degenerative disc disease         DISPOSITION  DISCHARGE    Patient discharged in stable condition.    Reviewed implications of results, diagnosis, meds, responsibility to follow up, warning signs and symptoms of possible worsening, potential complications and reasons to return to ER, including worsening/persistent pain, numbness/tingling/weakness in legs, loss of bowel/bladder control, saddle anesthesia, fever, or other concern.    Patient/Family voiced understanding of above instructions.    Discussed plan for discharge, as there is no emergent indication for admission. Patient referred to primary care provider for BP management due to today's BP. Pt/family is agreeable and understands need for follow up and repeat testing.  Pt is aware that discharge does not mean that nothing is wrong but it indicates no emergency is present that requires admission and they must continue care with follow-up as given below or physician of their choice.     FOLLOW-UP  Marylu Gerber, APRN  [address] [id number] [phone]    Schedule an appointment as soon as possible for a visit            Medication List        New Prescriptions      HYDROcodone-acetaminophen 5-325 MG per tablet  Commonly known as: NORCO  Take 1-2 tablets by mouth Every 6 (Six) Hours As Needed for Moderate Pain.     metaxalone 800 MG tablet  Commonly known as: SKELAXIN  Take 1 tablet by mouth 3 (Three) Times a Day.     methylPREDNISolone 4 MG dose pack  Commonly known as: MEDROL  Take as directed on package instructions.     naproxen 500 MG EC tablet  Commonly known as: EC NAPROSYN  Take 1 tablet by mouth 2 (Two) Times a Day As Needed for Mild Pain.               Where to Get Your Medications        These medications were sent to [facility]/pharmacy #[id number] - [place], [place] - [address] IN THE [place] - [phone] [facility] [phone]   [address] [facility] [address]      Hours: 24-hours Phone: [phone]   HYDROcodone-acetaminophen 5-325 MG per tablet  metaxalone 800 MG tablet  methylPREDNISolone 4 MG dose pack  naproxen 500 MG EC tablet                   Latest Documented Vital Signs:  As of 18:26 EDT  BP- (!) 157/111 HR- 82 Temp- 97.7 øF (36.5 øC) O2 sat- 94%      SUZETTE reviewed by Jean Vieira MD       --    Please note that portions of this were completed with a voice recognition program.       Note Disclaimer: At [facility], we believe that sharing information builds trust and better relationships. You are receiving this note because you are receiving care at [facility] or recently visited. It is possible you will see health information before a provider has talked with you about it. This kind of information can be easy to misunderstand. To help you fully understand what it means for your health, we urge you to discuss this note with your provider.             Jean Vieira MD  10/08/23 4799

## 2023-10-09 ENCOUNTER — TELEPHONE (OUTPATIENT)
Dept: INTERNAL MEDICINE | Facility: CLINIC | Age: 58
End: 2023-10-09

## 2023-10-09 NOTE — TELEPHONE ENCOUNTER
Hub staff attempted to follow warm transfer process and was unsuccessful     Caller: Edvin Carmen    Relationship to patient: Self    Best call back number: [phone]    Patient is needing: PATIENT WAS IN THE EMERGENCY ROOM ON 10/8/23 AT [facility] FOR LOWER BACK PAIN.     PATIENT WAS GIVEN MEDICATIONS IN THE EMERGENCY ROOM AND WAS TOLD TO GET SOME FOLLOW UP TESTING DONE.     PATIENT IS REQUESTING A FOLLOW UP TO GET MEDICATIONS REFILLED FROM THE ER AND TO GET ORDERS FOR THOSE FOLLOW UP TESTS.     PLEASE ADVISE AND SCHEDULE.

## 2023-10-10 ENCOUNTER — TELEPHONE (OUTPATIENT)
Dept: INTERNAL MEDICINE | Facility: CLINIC | Age: 58
End: 2023-10-10
Payer: COMMERCIAL

## 2023-10-10 NOTE — TELEPHONE ENCOUNTER
Spoke with patient - worked in for video visit next day 10/11 at 12:45. Sent to eddie to schedule.

## 2023-10-10 NOTE — TELEPHONE ENCOUNTER
Patient's wife's called and is asking for medical advise.  Patient is on the floor and can not get up.  Patient legs do not work and in a lot of pain.  Per Avani, told spouse to call 911 and wife states need to speak with Avani/ Marylu for medical advise.  Please advise

## 2023-10-11 ENCOUNTER — TELEMEDICINE (OUTPATIENT)
Dept: INTERNAL MEDICINE | Facility: CLINIC | Age: 58
End: 2023-10-11
Payer: COMMERCIAL

## 2023-10-11 ENCOUNTER — TELEPHONE (OUTPATIENT)
Dept: INTERNAL MEDICINE | Facility: CLINIC | Age: 58
End: 2023-10-11

## 2023-10-11 DIAGNOSIS — M54.42 ACUTE RIGHT-SIDED LOW BACK PAIN WITH LEFT-SIDED SCIATICA: Primary | ICD-10-CM

## 2023-10-11 PROCEDURE — 99213 OFFICE O/P EST LOW 20 MIN: CPT | Performed by: NURSE PRACTITIONER

## 2023-10-11 NOTE — TELEPHONE ENCOUNTER
Caller: Catherine Pardo    Relationship to patient: Emergency Contact    Best call back number: [phone]    Patient is needing: PATIENT'S VISIT WAS SCHEDULED AS AN IN OFFICE VISIT    PATIENT CALLED AT 12:47 ON 10/11 AND SAID HE DID NOT REALIZE HE WAS DOWN FOR AN IN OFFICE VISIT    PATIENT WANTS TO SEE IF THIS CAN BE RESCHEDULED FOR A MYCHART VISIT. HE SAID THAT HE HAD ALREADY PAID ONLINE FOR THIS VISIT     REQUESTED CALLBACK

## 2023-10-11 NOTE — TELEPHONE ENCOUNTER
Caller: Edvin Carmen    Relationship to patient: Self    Best call back number: [phone]    Chief complaint:     Type of visit: SCHEDULED AS IN OFFICE, NEEDS TO BE MY CHART     Requested date: 10-11-23 12:45     If rescheduling, when is the original appointment:      Additional notes:PATIENT STATES HIS WIFE CALLED YESTERDAY AND WAS TOLD THIS COULD BE CHANGED TO VIDEO VISIT.   PLEASE ADVISE

## 2023-10-13 DIAGNOSIS — M54.41 ACUTE RIGHT-SIDED LOW BACK PAIN WITH RIGHT-SIDED SCIATICA: ICD-10-CM

## 2023-10-13 DIAGNOSIS — M51.36 LUMBAR DEGENERATIVE DISC DISEASE: ICD-10-CM

## 2023-10-13 RX ORDER — HYDROCODONE BITARTRATE AND ACETAMINOPHEN 5; 325 MG/1; MG/1
1-2 TABLET ORAL EVERY 6 HOURS PRN
Qty: 30 TABLET | Refills: 0 | Status: SHIPPED | OUTPATIENT
Start: 2023-10-13

## 2023-10-13 NOTE — TELEPHONE ENCOUNTER
Caller: Edvin Carmen    Relationship: Self    Best call back number: [phone]     Requested Prescriptions:   Requested Prescriptions     Pending Prescriptions Disp Refills    HYDROcodone-acetaminophen (NORCO) 5-325 MG per tablet 15 tablet 0     Sig: Take 1-2 tablets by mouth Every 6 (Six) Hours As Needed for Moderate Pain.        Pharmacy where request should be sent: [facility]/PHARMACY #[id number] - [place], [place] - [address] IN [facility] [phone] [facility] [phone] FX     Last office visit with prescribing clinician: 4/19/2023   Last telemedicine visit with prescribing clinician: Visit date not found   Next office visit with prescribing clinician: Visit date not found     Additional details provided by patient: PATIENT STATES THAT HE WAS PRESCRIBED THE MEDICATION AT THE ER FOR A BACK INJURY. HE IS GETTING BETTER, BUT STILL HAS SOME PAIN. HE CURRENTLY HAS 1 TABLET REMAINING    Does the patient have less than a 3 day supply:  [x] Yes  [] No    Would you like a call back once the refill request has been completed: [] Yes [x] No    If the office needs to give you a call back, can they leave a voicemail: [] Yes [x] No    Joselo Gary Rep   10/13/23 11:00 EDT

## 2023-10-13 NOTE — TELEPHONE ENCOUNTER
TOMMY    Attempted to call patient to schedule 6 month follow up, voicemail box was full. [employer] message sent    HUB TO READ  In order to receive controlled substances, you have to be seen every 6 months. We were calling to go ahead and get that appointment scheduled so we have it on file. Please schedule 6 month follow up with Marylu around April of 2024.    HUB  Please let us know once appt has been scheduled so we can process refill. Thank you!

## 2023-10-13 NOTE — TELEPHONE ENCOUNTER
Please advise - patient was prescribed these in the hospital and would like you to continue them    Last office visit with prescribing clinician: 10/11/2023    Next office visit with prescribing clinician: 4/1/2024

## 2023-10-13 NOTE — TELEPHONE ENCOUNTER
Caller: Edvin Carmen    Relationship: Self    Best call back number: [phone]     What was the call regarding: HUB RELAYED MESSAGE. PATIENT SCHEDULED FOR 4/1/24

## 2023-10-22 NOTE — PROGRESS NOTES
"Chief Complaint  Back Pain    Subjective        Edvin Carmen presents to [facility] due to low back pain.    He c/o increased low back pain which began dominique 10-14 days ago. He denies acute injury or trauma but has had been moving which he believes exacerbated symptoms. He was taken to the ER 10/8 by EMS due to decreased mobility. Pain radiates into the anterior right thigh. He denies weakness of lower extremities and/or change in bowel or bladder function. He had similar pain several years ago.    His CT lumbar spine 10/8 showed multilevel DDD with mild-to-moderate canal stenosis at L3-4. He was started on a Medrol Donovan and Skelaxin. He was also given Hydrocodone for breakthrough pain. He states pain is only mildly better. Pain is worse with prolonged sitting and/or standing.    You have chosen to receive care through a telehealth visit.  Do you consent to use a video/audio connection for your medical care today? Yes    Location of patient: home  Location of provider: [facility]  The visit included audio and video interaction between patient and provider due to the COVID-19 pandemic.    History of Present Illness    Objective   Vital Signs:  There were no vitals taken for this visit.  Estimated body mass index is 30.85 kg/m² as calculated from the following:    Height as of 10/8/23: 177.8 cm (70\").    Weight as of 10/8/23: 97.5 kg (215 lb).       BMI is within normal parameters. No other follow-up for BMI required.      Physical Exam  Constitutional:       Appearance: He is well-developed.   HENT:      Head: Normocephalic and atraumatic.   Eyes:      General:         Right eye: No discharge.         Left eye: No discharge.      Conjunctiva/sclera: Conjunctivae normal.   Pulmonary:      Effort: Pulmonary effort is normal.   Neurological:      Mental Status: He is alert and oriented to person, place, and time.   Psychiatric:         Behavior: Behavior normal.         Thought Content: " Thought content normal.         Judgment: Judgment normal.        Result Review :  The following data was reviewed by: EMILY Kwok on 10/11/2023:  Common labs          1/19/2023    12:42   Common Labs   Glucose 90    BUN 8    Creatinine 0.93    Sodium 140    Potassium 4.7    Chloride 103    Calcium 10.0    Total Protein 7.1    Albumin 4.5    Total Bilirubin 0.5    Alkaline Phosphatase 49    AST (SGOT) 19    ALT (SGPT) 16    WBC 4.54    Hemoglobin 15.3    Hematocrit 44.9    Platelets 333    Total Cholesterol 223    Triglycerides 115    HDL Cholesterol 61    LDL Cholesterol  142    PSA 0.389      Data reviewed : Radiologic studies CT lumbar spine 10/8/23           Assessment and Plan   Diagnoses and all orders for this visit:    1. Acute right-sided low back pain with left-sided sciatica (Primary)    He will complete the Medrol melba for inflammation, also has Rx for Naprosyn which he may take for inflammation once Medrol is complete. He may continue Hydrocodone for severe pain, will refill when medication is complete.    Consider MRI if pain does not improve.    History and medical judgement obtained from video conversation with patient. No hands-on physical examination was performed. Approximately 13 minutes spent in video conversation with patient and in chart review.         Follow Up   No follow-ups on file.  Patient was given instructions and counseling regarding his condition or for health maintenance advice. Please see specific information pulled into the AVS if appropriate.

## 2024-03-22 DIAGNOSIS — I10 ESSENTIAL HYPERTENSION: ICD-10-CM

## 2024-03-22 RX ORDER — LOSARTAN POTASSIUM 100 MG/1
TABLET ORAL
Qty: 90 TABLET | Refills: 0 | Status: SHIPPED | OUTPATIENT
Start: 2024-03-22

## 2024-04-01 ENCOUNTER — OFFICE VISIT (OUTPATIENT)
Dept: INTERNAL MEDICINE | Facility: CLINIC | Age: 59
End: 2024-04-01
Payer: COMMERCIAL

## 2024-04-01 VITALS
WEIGHT: 237.2 LBS | BODY MASS INDEX: 34.03 KG/M2 | DIASTOLIC BLOOD PRESSURE: 84 MMHG | SYSTOLIC BLOOD PRESSURE: 134 MMHG | HEART RATE: 94 BPM | OXYGEN SATURATION: 95 %

## 2024-04-01 DIAGNOSIS — Z12.5 SCREENING FOR PROSTATE CANCER: ICD-10-CM

## 2024-04-01 DIAGNOSIS — R10.13 DYSPEPSIA: ICD-10-CM

## 2024-04-01 DIAGNOSIS — Z00.00 PE (PHYSICAL EXAM), ANNUAL: Primary | ICD-10-CM

## 2024-04-01 DIAGNOSIS — I10 ESSENTIAL HYPERTENSION: Chronic | ICD-10-CM

## 2024-04-01 DIAGNOSIS — Z12.11 SCREENING FOR COLON CANCER: ICD-10-CM

## 2024-04-01 DIAGNOSIS — L40.9 PSORIASIS: ICD-10-CM

## 2024-04-01 PROBLEM — L30.9 DERMATITIS: Status: RESOLVED | Noted: 2023-02-05 | Resolved: 2024-04-01

## 2024-04-01 LAB
ALBUMIN SERPL-MCNC: 4.4 G/DL (ref 3.5–5.2)
ALBUMIN/GLOB SERPL: 1.5 G/DL
ALP SERPL-CCNC: 56 U/L (ref 39–117)
ALT SERPL-CCNC: 21 U/L (ref 1–41)
AST SERPL-CCNC: 15 U/L (ref 1–40)
BILIRUB SERPL-MCNC: 0.4 MG/DL (ref 0–1.2)
BUN SERPL-MCNC: 15 MG/DL (ref 6–20)
BUN/CREAT SERPL: 14.9 (ref 7–25)
CALCIUM SERPL-MCNC: 10 MG/DL (ref 8.6–10.5)
CHLORIDE SERPL-SCNC: 102 MMOL/L (ref 98–107)
CHOLEST SERPL-MCNC: 264 MG/DL (ref 0–200)
CO2 SERPL-SCNC: 24.3 MMOL/L (ref 22–29)
CREAT SERPL-MCNC: 1.01 MG/DL (ref 0.76–1.27)
EGFRCR SERPLBLD CKD-EPI 2021: 86.7 ML/MIN/1.73
ERYTHROCYTE [DISTWIDTH] IN BLOOD BY AUTOMATED COUNT: 12.4 % (ref 12.3–15.4)
GLOBULIN SER CALC-MCNC: 2.9 GM/DL
GLUCOSE SERPL-MCNC: 95 MG/DL (ref 65–99)
HCT VFR BLD AUTO: 43.9 % (ref 37.5–51)
HDLC SERPL-MCNC: 63 MG/DL (ref 40–60)
HGB BLD-MCNC: 14.9 G/DL (ref 13–17.7)
LDLC SERPL CALC-MCNC: 173 MG/DL (ref 0–100)
MCH RBC QN AUTO: 31 PG (ref 26.6–33)
MCHC RBC AUTO-ENTMCNC: 33.9 G/DL (ref 31.5–35.7)
MCV RBC AUTO: 91.3 FL (ref 79–97)
PLATELET # BLD AUTO: 316 10*3/MM3 (ref 140–450)
POTASSIUM SERPL-SCNC: 4.4 MMOL/L (ref 3.5–5.2)
PROT SERPL-MCNC: 7.3 G/DL (ref 6–8.5)
PSA SERPL-MCNC: 0.41 NG/ML (ref 0–4)
RBC # BLD AUTO: 4.81 10*6/MM3 (ref 4.14–5.8)
SODIUM SERPL-SCNC: 140 MMOL/L (ref 136–145)
TRIGL SERPL-MCNC: 157 MG/DL (ref 0–150)
TSH SERPL DL<=0.005 MIU/L-ACNC: 3.78 UIU/ML (ref 0.27–4.2)
VLDLC SERPL CALC-MCNC: 28 MG/DL (ref 5–40)
WBC # BLD AUTO: 5.52 10*3/MM3 (ref 3.4–10.8)

## 2024-04-01 RX ORDER — CLOBETASOL PROPIONATE 0.5 MG/G
1 OINTMENT TOPICAL 2 TIMES DAILY
Qty: 30 G | Refills: 0 | Status: SHIPPED | OUTPATIENT
Start: 2024-04-01

## 2024-04-01 RX ORDER — FAMOTIDINE 20 MG/1
20 TABLET, FILM COATED ORAL 2 TIMES DAILY
Qty: 30 TABLET | Refills: 5 | Status: SHIPPED | OUTPATIENT
Start: 2024-04-01

## 2024-04-01 RX ORDER — LOSARTAN POTASSIUM 100 MG/1
100 TABLET ORAL DAILY
Qty: 90 TABLET | Refills: 1 | Status: SHIPPED | OUTPATIENT
Start: 2024-04-01

## 2024-04-01 NOTE — ASSESSMENT & PLAN NOTE
Discussed triggering factors (including smoking), begin Pepcid 20 mg daily and monitor for worsening sx.

## 2024-04-01 NOTE — PROGRESS NOTES
Subjective   Edvin Carmen is a 58 y.o. male who is here for a physical exam.    History of Present Illness   History of Present Illness  The patient is a 58-year-old male who presents for evaluation of multiple medical concerns.    The patient reports an improvement in his stress levels compared to the previous year. 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 He expresses a desire to change his sedentary lifestyle, which has negatively impacted his weight. His current medication regimen includes losartan, which he takes at approximately 10 or 11:30 in the morning. He does not monitor his blood pressure at home, although he has previously owned a blood pressure cuff.    The patient's psoriasis has significantly improved, with approximately 10 percent reduction in symptoms on his forehead and arms. He has previously consulted a dermatologist who recommended Skyrizi, however, he did not start the medication due to improvement in sx. He is currently using clobetasol and is requesting a refill.    The patient experiences heartburn, which has increased in severity.  His dentist has noticed erosion of his teeth due to reflux. His symptoms have slightly worsened over the past year, although not severe. He has initiated Tums for this symptom.    The patient has recently started smoking, having been smoking for the past 6 months. Despite multiple attempts to quit, he has been unsuccessful due to intermittent life stressors. He is seeking advice on nicotine gum or other de-escalation strategies. His wife, who also smokes, does not smoke in the house.     The patient believes his Cologuard kit is about to . He has declined a colonoscopy.    Supplemental Information  He was bitten by a tick and had sx of Alpha Gal for dominique 18 months, sx have since improved.  He denies headaches, dizziness, lightheadedness, or shortness of breath. He sleeps well at night and does not get up much to go to the  bathroom.   The patient has no known allergies.      Past Medical History:   Diagnosis Date    Anxiety     History of medical problems 01/01/2018    Pericarditis    Kidney stone 01/01/2013    Pericarditis 06/2019         Current Outpatient Medications:     clobetasol (TEMOVATE) 0.05 % external solution, , Disp: , Rfl:     clobetasol (TEMOVATE) 0.05 % ointment, Apply 1 Application topically to the appropriate area as directed 2 (Two) Times a Day., Disp: 30 g, Rfl: 0    losartan (COZAAR) 100 MG tablet, Take 1 tablet by mouth Daily., Disp: 90 tablet, Rfl: 1    Sodium Fluoride 5000 PPM 1.1 % paste, PLEASE SEE ATTACHED FOR DETAILED DIRECTIONS, Disp: , Rfl:     famotidine (Pepcid) 20 MG tablet, Take 1 tablet by mouth 2 (Two) Times a Day., Disp: 30 tablet, Rfl: 5    No Known Allergies    Review of Systems   Constitutional:  Negative for activity change, appetite change, chills, diaphoresis, fatigue, fever and unexpected weight change.   HENT:  Negative for congestion, dental problem, drooling, ear discharge, ear pain, facial swelling, hearing loss, mouth sores, nosebleeds, postnasal drip, rhinorrhea, sinus pressure, sore throat, tinnitus and trouble swallowing.    Eyes:  Negative for photophobia, pain, discharge, redness, itching and visual disturbance.   Respiratory:  Negative for apnea, cough, choking, chest tightness, shortness of breath and wheezing.    Cardiovascular:  Negative for chest pain, palpitations and leg swelling.        No orthopnea, PND, MARQUIS   Gastrointestinal:  Negative for abdominal pain, blood in stool, constipation, diarrhea, nausea and vomiting.   Endocrine: Negative for cold intolerance, heat intolerance, polydipsia and polyuria.   Genitourinary:  Negative for decreased urine volume, dysuria, enuresis, flank pain, frequency, hematuria and urgency.   Musculoskeletal:  Negative for arthralgias, back pain, gait problem, joint swelling, myalgias, neck pain and neck stiffness.   Skin:  Negative for color  change and rash.        No hair changes, no nail changes   Allergic/Immunologic: Negative for environmental allergies, food allergies and immunocompromised state.   Neurological:  Negative for dizziness, tremors, seizures, syncope, speech difficulty, weakness, light-headedness, numbness and headaches.   Hematological:  Negative for adenopathy. Does not bruise/bleed easily.   Psychiatric/Behavioral:  Negative for agitation, confusion, decreased concentration, dysphoric mood, sleep disturbance and suicidal ideas. The patient is not nervous/anxious.        Objective   Vitals:    04/01/24 1119 04/01/24 1134   BP: 134/94 134/84   BP Location: Left arm Left arm   Patient Position: Sitting Sitting   Cuff Size: Large Adult Adult   Pulse: 94    SpO2: 95%    Weight: 108 kg (237 lb 3.2 oz)      Physical Exam  Constitutional:       General: He is not in acute distress.     Appearance: Normal appearance. He is not diaphoretic.   HENT:      Head: Normocephalic and atraumatic.      Right Ear: Tympanic membrane, ear canal and external ear normal.      Left Ear: Tympanic membrane, ear canal and external ear normal.      Nose: Nose normal. No rhinorrhea.      Mouth/Throat:      Mouth: Mucous membranes are moist.      Pharynx: Oropharynx is clear.   Eyes:      General:         Right eye: No discharge.         Left eye: No discharge.      Conjunctiva/sclera: Conjunctivae normal.   Cardiovascular:      Rate and Rhythm: Normal rate and regular rhythm.      Pulses: Normal pulses.      Heart sounds: Normal heart sounds.   Pulmonary:      Effort: Pulmonary effort is normal.      Breath sounds: Normal breath sounds.   Abdominal:      General: Bowel sounds are normal.      Tenderness: There is no abdominal tenderness.   Musculoskeletal:         General: No swelling or tenderness.      Cervical back: Normal range of motion.   Skin:     General: Skin is warm and dry.   Neurological:      General: No focal deficit present.      Mental Status: He  is alert and oriented to person, place, and time.   Psychiatric:         Mood and Affect: Mood normal.         Behavior: Behavior normal.         Judgment: Judgment normal.       Physical Exam  Vital Signs  The patient's blood pressure is 134/94. The patient's weight is 237.    Results         Assessment & Plan   Diagnoses and all orders for this visit:    1. PE (physical exam), annual (Primary)  -     CBC (No Diff)  -     Comprehensive Metabolic Panel  -     Lipid Panel  -     TSH    2. Psoriasis  Assessment & Plan:  He has seen dermatology and sx have improved with clobetasol cream, which he uses on PRN basis continue to monitor.    Orders:  -     clobetasol (TEMOVATE) 0.05 % ointment; Apply 1 Application topically to the appropriate area as directed 2 (Two) Times a Day.  Dispense: 30 g; Refill: 0    3. Essential hypertension  Assessment & Plan:  BP is mildly elevated in the office today, he will monitor at home and call with readings in 2-3 weeks. Continue losartan daily.    Orders:  -     losartan (COZAAR) 100 MG tablet; Take 1 tablet by mouth Daily.  Dispense: 90 tablet; Refill: 1    4. Dyspepsia  Assessment & Plan:  Discussed triggering factors (including smoking), begin Pepcid 20 mg daily and monitor for worsening sx.    Orders:  -     famotidine (Pepcid) 20 MG tablet; Take 1 tablet by mouth 2 (Two) Times a Day.  Dispense: 30 tablet; Refill: 5    5. Screening for colon cancer  -     Cologuard - Stool, Per Rectum; Future    6. Screening for prostate cancer  -     PSA Screen      Assessment & Plan  Psoriasis.  The patient was advised against the use of clobetasol on his face due to its potent properties and potential skin thinning. A prescription refill for clobetasol was provided.    2. Hypertension.  The patient's blood pressure was slightly elevated during this visit, potentially due to inadequate rest. The target blood pressure range is 120s to 130s over 70s to 80s. The patient's weight has slightly  increased, from 215 in 10/2023 to 237 today, which could potentially impact his blood pressure. The patient was advised to monitor his blood pressure while at rest and to report if it consistently remains high. A prescription refill for losartan was provided.    3. Heartburn.  The patient was advised to take Pepcid daily. Consider EGD if sx persist despite taking medication daily.    4. Smoking cessation.  The patient was educated about the potential benefits of nicotine gum to minimize nicotine withdrawal. The use of Chantix, nicotine patches, and Nicotrol inhaler were discussed. The patient was advised against vaping.    5. Colon cancer screening.  A Cologuard test was ordered.    Follow-up  The patient is scheduled for a follow-up visit in 6 months.    Risk Assessment:  Family History   Problem Relation Age of Onset    Heart attack Mother 78    Dementia Mother     Heart disease Mother     Coronary artery disease Father 70        +smoker    Heart disease Father      His Body mass index is 34.03 kg/m². He is encouraged to make lifestyle changes to help with weight loss.    Prevention:  Health Maintenance   Topic Date Due    COLORECTAL CANCER SCREENING  Never done    Pneumococcal Vaccine 0-64 (1 of 2 - PCV) Never done    TDAP/TD VACCINES (1 - Tdap) Never done    ZOSTER VACCINE (1 of 2) Never done    COVID-19 Vaccine (4 - 2023-24 season) 09/01/2023    ANNUAL PHYSICAL  01/19/2024    INFLUENZA VACCINE  08/01/2024    BMI FOLLOWUP  04/01/2025    HEPATITIS C SCREENING  Completed       Discussed healthy lifestyle choices such as maintaining a balanced diet low in carbohydrates and limiting caffeine and alcohol intake.  Recommended routine exercise for bone strength and cardiovascular health.         Patient or patient representative verbalized consent for the use of Ambient Listening during the visit with  EMILY Kwok for chart documentation. 4/1/2024  19:33 EDT

## 2024-04-01 NOTE — ASSESSMENT & PLAN NOTE
He has seen dermatology and sx have improved with clobetasol cream, which he uses on PRN basis continue to monitor.

## 2024-04-01 NOTE — PATIENT INSTRUCTIONS
Please monitor your blood pressure at home and call (or send message) with your blood pressure readings in 2-3 weeks.

## 2024-04-01 NOTE — ASSESSMENT & PLAN NOTE
BP is mildly elevated in the office today, he will monitor at home and call with readings in 2-3 weeks. Continue losartan daily.

## 2024-07-01 DIAGNOSIS — R10.13 DYSPEPSIA: ICD-10-CM

## 2024-07-01 RX ORDER — FAMOTIDINE 20 MG/1
20 TABLET, FILM COATED ORAL 2 TIMES DAILY
Qty: 180 TABLET | Refills: 1 | Status: SHIPPED | OUTPATIENT
Start: 2024-07-01

## 2024-08-23 ENCOUNTER — TELEPHONE (OUTPATIENT)
Dept: INTERNAL MEDICINE | Facility: CLINIC | Age: 59
End: 2024-08-23
Payer: COMMERCIAL

## 2024-10-03 ENCOUNTER — OFFICE VISIT (OUTPATIENT)
Dept: INTERNAL MEDICINE | Facility: CLINIC | Age: 59
End: 2024-10-03
Payer: COMMERCIAL

## 2024-10-03 VITALS
BODY MASS INDEX: 33.7 KG/M2 | HEART RATE: 80 BPM | HEIGHT: 70 IN | DIASTOLIC BLOOD PRESSURE: 88 MMHG | TEMPERATURE: 97.6 F | OXYGEN SATURATION: 96 % | SYSTOLIC BLOOD PRESSURE: 130 MMHG | WEIGHT: 235.4 LBS

## 2024-10-03 DIAGNOSIS — L40.9 PSORIASIS: ICD-10-CM

## 2024-10-03 DIAGNOSIS — E78.00 HYPERCHOLESTEREMIA: Chronic | ICD-10-CM

## 2024-10-03 DIAGNOSIS — I10 ESSENTIAL HYPERTENSION: Primary | Chronic | ICD-10-CM

## 2024-10-03 PROCEDURE — 99214 OFFICE O/P EST MOD 30 MIN: CPT | Performed by: NURSE PRACTITIONER

## 2024-10-03 RX ORDER — LOSARTAN POTASSIUM 100 MG/1
100 TABLET ORAL DAILY
Qty: 90 TABLET | Refills: 1 | Status: SHIPPED | OUTPATIENT
Start: 2024-10-03

## 2024-10-03 NOTE — ASSESSMENT & PLAN NOTE
The 10-year ASCVD risk score (Keon GARCIA, et al., 2019) is: 15.3%    Values used to calculate the score:      Age: 58 years      Sex: Male      Is Non- : No      Diabetic: No      Tobacco smoker: Yes      Systolic Blood Pressure: 130 mmHg      Is BP treated: Yes      HDL Cholesterol: 63 mg/dL      Total Cholesterol: 264 mg/dL  Previous blood work in April showed elevated cholesterol levels with a total cholesterol of 264 and LDL of 173. Dietary modifications, including the consumption of oatmeal and reduction of animal products, were recommended. Cholesterol levels will be rechecked during his physical in 6 months. Declined statin therapy.

## 2024-10-20 PROBLEM — E78.00 HYPERCHOLESTEREMIA: Chronic | Status: ACTIVE | Noted: 2024-10-03

## 2024-10-20 NOTE — PROGRESS NOTES
"Chief Complaint  Hypertension (6 month follow up)  Subjective        Edvin Carmen presents to [facility]  History of Present Illness  History of Present Illness  The patient presents for evaluation of multiple medical concerns.    He is currently on a regimen of losartan 100 mg for blood pressure management. He has not received the influenza vaccine. His bowel movements are regular, and he reports no issues with abdominal pain and/or rectal bleeding. He has recently started taking famotidine, which seems to be effective.    His psoriasis is currently under control. He has been using clobetasol, a topical steroid, for treatment intermittently.    He does report not watching his diet as closely over the past several months with restarting smoking as well. Denies dyspnea.    Supplemental Information  He has some pain in his left hip, but when he bikes it goes away.    IMMUNIZATIONS  He has received COVID-19 vaccines in the past.    Objective   Vital Signs:  /88 (BP Location: Left arm, Patient Position: Sitting, Cuff Size: Large Adult)   Pulse 80   Temp 97.6 °F (36.4 °C)   Ht 177.8 cm (70\")   Wt 107 kg (235 lb 6.4 oz)   SpO2 96%   BMI 33.78 kg/m²   Estimated body mass index is 33.78 kg/m² as calculated from the following:    Height as of this encounter: 177.8 cm (70\").    Weight as of this encounter: 107 kg (235 lb 6.4 oz).            Physical Exam  Constitutional:       Appearance: He is well-developed. He is not ill-appearing.   HENT:      Head: Normocephalic.      Right Ear: Hearing, tympanic membrane and external ear normal.      Left Ear: Hearing, tympanic membrane and external ear normal.      Nose: Nose normal. No nasal deformity, mucosal edema or rhinorrhea.      Right Sinus: No maxillary sinus tenderness or frontal sinus tenderness.      Left Sinus: No maxillary sinus tenderness or frontal sinus tenderness.      Mouth/Throat:      Dentition: Normal dentition. "   Eyes:      General: Lids are normal.         Right eye: No discharge.         Left eye: No discharge.      Conjunctiva/sclera: Conjunctivae normal.      Right eye: No exudate.     Left eye: No exudate.  Neck:      Thyroid: No thyroid mass or thyromegaly.      Vascular: No carotid bruit.      Trachea: Trachea normal.   Cardiovascular:      Rate and Rhythm: Regular rhythm.      Pulses: Normal pulses.      Heart sounds: Normal heart sounds. No murmur heard.  Pulmonary:      Effort: No respiratory distress.      Breath sounds: Normal breath sounds. No decreased breath sounds, wheezing, rhonchi or rales.   Abdominal:      General: Bowel sounds are normal.      Palpations: Abdomen is soft.      Tenderness: There is no abdominal tenderness.   Musculoskeletal:      Cervical back: Normal range of motion. No edema.   Lymphadenopathy:      Head:      Right side of head: No submental, submandibular, tonsillar, preauricular, posterior auricular or occipital adenopathy.      Left side of head: No submental, submandibular, tonsillar, preauricular, posterior auricular or occipital adenopathy.   Skin:     General: Skin is warm and dry.      Nails: There is no clubbing.   Neurological:      Mental Status: He is alert.   Psychiatric:         Behavior: Behavior is cooperative.        Physical Exam  Vital Signs  Blood pressure measures 136/86.    Result Review :  The following data was reviewed by: EMILY Kwok on 10/03/2024:  Common labs          4/1/2024    11:54   Common Labs   Glucose 95    BUN 15    Creatinine 1.01    Sodium 140    Potassium 4.4    Chloride 102    Calcium 10.0    Total Protein 7.3    Albumin 4.4    Total Bilirubin 0.4    Alkaline Phosphatase 56    AST (SGOT) 15    ALT (SGPT) 21    WBC 5.52    Hemoglobin 14.9    Hematocrit 43.9    Platelets 316    Total Cholesterol 264    Triglycerides 157    HDL Cholesterol 63    LDL Cholesterol  173    PSA 0.406           Results  Laboratory Studies  Total cholesterol  was 264, LDL was 173.             Assessment and Plan   Diagnoses and all orders for this visit:    1. Essential hypertension (Primary)  Assessment & Plan:  Blood pressure was elevated at 136/86 during today's visit. He is currently taking losartan 100 mg. Regular exercise was recommended to help manage his blood pressure. A prescription for losartan has been refilled and sent to [facility].    Orders:  -     losartan (COZAAR) 100 MG tablet; Take 1 tablet by mouth Daily.  Dispense: 90 tablet; Refill: 1    2. Psoriasis  Assessment & Plan:  He has seen dermatology and sx have improved with clobetasol cream, which he uses on PRN basis continue to monitor.      3. Hypercholesteremia  Assessment & Plan:  The 10-year ASCVD risk score (Keon GARCIA, et al., 2019) is: 15.3%    Values used to calculate the score:      Age: 58 years      Sex: Male      Is Non- : No      Diabetic: No      Tobacco smoker: Yes      Systolic Blood Pressure: 130 mmHg      Is BP treated: Yes      HDL Cholesterol: 63 mg/dL      Total Cholesterol: 264 mg/dL  Previous blood work in April showed elevated cholesterol levels with a total cholesterol of 264 and LDL of 173. Dietary modifications, including the consumption of oatmeal and reduction of animal products, were recommended. Cholesterol levels will be rechecked during his physical in 6 months. Declined statin therapy.        Assessment & Plan    Health Maintenance.  He was reminded to check the expiration date on his Cologuard kit and to collect and return the sample if it is still valid. If the kit is , a new order will be sent.    Follow-up  Return in 6 months for a physical and recheck of cholesterol levels.         Follow Up   Return in about 6 months (around 4/3/2025) for Annual physical.  Patient was given instructions and counseling regarding his condition or for health maintenance advice. Please see specific information pulled into the AVS if appropriate.     Patient  or patient representative verbalized consent for the use of Ambient Listening during the visit with  EMILY Kwok for chart documentation. 10/20/2024  10:59 EDT

## 2024-10-20 NOTE — ASSESSMENT & PLAN NOTE
Blood pressure was elevated at 136/86 during today's visit. He is currently taking losartan 100 mg. Regular exercise was recommended to help manage his blood pressure. A prescription for losartan has been refilled and sent to [facility].

## 2025-03-05 DIAGNOSIS — R10.13 DYSPEPSIA: ICD-10-CM

## 2025-03-05 RX ORDER — FAMOTIDINE 20 MG/1
20 TABLET, FILM COATED ORAL 2 TIMES DAILY
Qty: 180 TABLET | Refills: 1 | Status: SHIPPED | OUTPATIENT
Start: 2025-03-05

## 2025-04-09 ENCOUNTER — OFFICE VISIT (OUTPATIENT)
Dept: INTERNAL MEDICINE | Facility: CLINIC | Age: 60
End: 2025-04-09
Payer: COMMERCIAL

## 2025-04-09 VITALS
HEART RATE: 79 BPM | HEIGHT: 70 IN | OXYGEN SATURATION: 95 % | BODY MASS INDEX: 34.93 KG/M2 | WEIGHT: 244 LBS | TEMPERATURE: 98.1 F | DIASTOLIC BLOOD PRESSURE: 88 MMHG | SYSTOLIC BLOOD PRESSURE: 128 MMHG

## 2025-04-09 DIAGNOSIS — Z72.0 TOBACCO USE: ICD-10-CM

## 2025-04-09 DIAGNOSIS — M25.512 ACUTE PAIN OF LEFT SHOULDER: ICD-10-CM

## 2025-04-09 DIAGNOSIS — R10.13 DYSPEPSIA: ICD-10-CM

## 2025-04-09 DIAGNOSIS — E78.00 HYPERCHOLESTEREMIA: Chronic | ICD-10-CM

## 2025-04-09 DIAGNOSIS — I10 ESSENTIAL HYPERTENSION: Chronic | ICD-10-CM

## 2025-04-09 DIAGNOSIS — M54.16 LUMBAR RADICULOPATHY: Chronic | ICD-10-CM

## 2025-04-09 DIAGNOSIS — Z00.00 PHYSICAL EXAM: Primary | ICD-10-CM

## 2025-04-09 DIAGNOSIS — Z12.5 SCREENING FOR PROSTATE CANCER: ICD-10-CM

## 2025-04-09 LAB
ALBUMIN SERPL-MCNC: 4.2 G/DL (ref 3.5–5.2)
ALBUMIN/GLOB SERPL: 1.4 G/DL
ALP SERPL-CCNC: 59 U/L (ref 39–117)
ALT SERPL-CCNC: 23 U/L (ref 1–41)
AST SERPL-CCNC: 24 U/L (ref 1–40)
BILIRUB SERPL-MCNC: 0.5 MG/DL (ref 0–1.2)
BUN SERPL-MCNC: 12 MG/DL (ref 6–20)
BUN/CREAT SERPL: 14.1 (ref 7–25)
CALCIUM SERPL-MCNC: 10 MG/DL (ref 8.6–10.5)
CHLORIDE SERPL-SCNC: 102 MMOL/L (ref 98–107)
CHOLEST SERPL-MCNC: 305 MG/DL (ref 0–200)
CO2 SERPL-SCNC: 25.8 MMOL/L (ref 22–29)
CREAT SERPL-MCNC: 0.85 MG/DL (ref 0.76–1.27)
EGFRCR SERPLBLD CKD-EPI 2021: 100.7 ML/MIN/1.73
ERYTHROCYTE [DISTWIDTH] IN BLOOD BY AUTOMATED COUNT: 12.1 % (ref 12.3–15.4)
GLOBULIN SER CALC-MCNC: 3 GM/DL
GLUCOSE SERPL-MCNC: 103 MG/DL (ref 65–99)
HCT VFR BLD AUTO: 47 % (ref 37.5–51)
HDLC SERPL-MCNC: 63 MG/DL (ref 40–60)
HGB BLD-MCNC: 16.4 G/DL (ref 13–17.7)
LDLC SERPL CALC-MCNC: 205 MG/DL (ref 0–100)
MCH RBC QN AUTO: 32.1 PG (ref 26.6–33)
MCHC RBC AUTO-ENTMCNC: 34.9 G/DL (ref 31.5–35.7)
MCV RBC AUTO: 92 FL (ref 79–97)
PLATELET # BLD AUTO: 332 10*3/MM3 (ref 140–450)
POTASSIUM SERPL-SCNC: 5 MMOL/L (ref 3.5–5.2)
PROT SERPL-MCNC: 7.2 G/DL (ref 6–8.5)
PSA SERPL-MCNC: 0.43 NG/ML (ref 0–4)
RBC # BLD AUTO: 5.11 10*6/MM3 (ref 4.14–5.8)
SODIUM SERPL-SCNC: 138 MMOL/L (ref 136–145)
TRIGL SERPL-MCNC: 197 MG/DL (ref 0–150)
TSH SERPL DL<=0.005 MIU/L-ACNC: 2.67 UIU/ML (ref 0.27–4.2)
VLDLC SERPL CALC-MCNC: 37 MG/DL (ref 5–40)
WBC # BLD AUTO: 5.36 10*3/MM3 (ref 3.4–10.8)

## 2025-04-15 DIAGNOSIS — E78.00 HYPERCHOLESTEREMIA: Primary | Chronic | ICD-10-CM

## 2025-04-15 RX ORDER — ATORVASTATIN CALCIUM 10 MG/1
10 TABLET, FILM COATED ORAL DAILY
Qty: 90 TABLET | Refills: 1 | Status: SHIPPED | OUTPATIENT
Start: 2025-04-15

## 2025-04-24 ENCOUNTER — TELEPHONE (OUTPATIENT)
Dept: INTERNAL MEDICINE | Facility: CLINIC | Age: 60
End: 2025-04-24
Payer: COMMERCIAL

## 2025-04-24 NOTE — TELEPHONE ENCOUNTER
Caller: Edvin Carmen    Relationship: Self    Best call back number: [phone]     Which medication are you concerned about: atorvastatin (LIPITOR) 10 MG tablet     Who prescribed you this medication: [facility]    What are your concerns: PATIENT STATES THAT PCP HAD PRESCRIBED ATORVASTATIN, BUT HIS PHARMACIST HAD RECOMMENDED ROSUVASTATIN AS AN ALTERNATIVE TO AVOID MUSCLE WEAKNESS AS A POTENTIAL SIDE EFFECT. REQUESTS A CALL BACK TO DISCUSS FURTHER AND SEE IF PRESCRIPTION CAN BE CHANGED.

## 2025-04-25 RX ORDER — ROSUVASTATIN CALCIUM 10 MG/1
10 TABLET, COATED ORAL DAILY
Qty: 90 TABLET | Refills: 1 | Status: SHIPPED | OUTPATIENT
Start: 2025-04-25

## 2025-04-25 NOTE — TELEPHONE ENCOUNTER
Please clarify if he is experiencing muscle weakness with atorvastatin-if he is I would not recommend rosuvastatin because they are in the same class of drugs. I will send in a prescription for rosuvastatin but please advise if he is having problems with atorvastatin. Thanks.

## 2025-04-25 NOTE — TELEPHONE ENCOUNTER
Spoke with patient, he states he is not having issues with Atorvastatin as he didn't start it yet. Patient advised Rosuvastatin has been sent to pharmacy. He will contact us with any further issues

## 2025-04-27 PROBLEM — Z72.0 TOBACCO USE: Chronic | Status: ACTIVE | Noted: 2025-04-27

## 2025-04-27 PROBLEM — Z72.0 TOBACCO USE: Status: ACTIVE | Noted: 2025-04-27

## 2025-04-27 PROBLEM — M25.512 ACUTE PAIN OF LEFT SHOULDER: Status: ACTIVE | Noted: 2025-04-27

## 2025-04-27 PROBLEM — L40.9 PSORIASIS: Chronic | Status: ACTIVE | Noted: 2024-04-01

## 2025-04-27 PROBLEM — M25.512 ACUTE PAIN OF LEFT SHOULDER: Chronic | Status: ACTIVE | Noted: 2025-04-27

## 2025-04-27 PROBLEM — R10.13 DYSPEPSIA: Chronic | Status: ACTIVE | Noted: 2024-04-01

## 2025-04-27 NOTE — ASSESSMENT & PLAN NOTE
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 He has previously used nicotine gum, which he found helpful. He is encouraged to use nicotine gum again and consider other smoking cessation aids if needed.   What Type Of Note Output Would You Prefer (Optional)?: Standard Output How Severe Are Your Spot(S)?: mild Have Your Spot(S) Been Treated In The Past?: has not been treated Hpi Title: Evaluation of Skin Lesions

## 2025-04-27 NOTE — ASSESSMENT & PLAN NOTE
Previous blood work showed elevated total cholesterol at 264 mg/dL and LDL at 173 mg/dL. Blood work will be conducted today to reassess cholesterol levels, consider statin therapy if LDL remains elevated.

## 2025-04-27 NOTE — ASSESSMENT & PLAN NOTE
His psoriasis has improved with only a few spots remaining. Stress management is discussed as a potential factor in flare-ups-continue current treatments as prescribed by dermatology.

## 2025-04-27 NOTE — ASSESSMENT & PLAN NOTE
His blood pressure is slightly elevated at 128/88 mmHg. He is currently taking losartan. He is advised to monitor his blood pressure at home and call if readings are consistently >135/85.

## 2025-04-27 NOTE — ASSESSMENT & PLAN NOTE
He has multilevel degenerative disease in the lumbar spine, confirmed by a previous CT scan. The condition is worse at L3-4. He is advised to continue yoga and consider physical therapy if pain persists.

## 2025-04-27 NOTE — ASSESSMENT & PLAN NOTE
He is taking famotidine daily and finds one tablet sufficient despite the recommendation of two. He is advised to continue with the current dosage if it remains effective.

## 2025-04-27 NOTE — ASSESSMENT & PLAN NOTE
He reports intermittent pain and popping in the left shoulder, possibly due to a partial ligament tear. He is advised to continue yoga and monitor the pain. If the pain worsens or limits motion, further evaluation will be considered. He may take Tylenol as needed for pain.

## 2025-04-27 NOTE — PROGRESS NOTES
Subjective   Edvin Carmen is a 59 y.o. male who is here for a physical exam.    History of Present Illness   History of Present Illness    He has been adhering to his losartan regimen but has not been monitoring his blood pressure at home.    He reports no current allergy symptoms, although he had allergies during his childhood. He is not experiencing any headaches, dizziness, lightheadedness, or vision changes. He also reports no shortness of breath, chest tightness, wheezing, or edema.     His bowel movements are regular, and he reports no presence of blood in his stool. He sleeps well, averaging 6 hours per night. He has remained healthy throughout the winter season with no acute illnesses.    He has a history of left shoulder issues and back injuries. He experiences intermittent shoulder pain, which he describes as a popping sensation that requires movement for relief. He suspects potential ligament damage due to past injuries. Despite this, he continues to practice yoga and believes that muscle strengthening exercises may be beneficial.    He has noticed a few spots of psoriasis on his skin (has seen derm in the past).    He is currently on a daily dose of famotidine, which he finds sufficient-denies abdominal pain.    He has a family history of heart disease in both parents. His father is a smoker, and he is also a smoker. He plans to quit smoking within a month. He has tried gum in the past to quit smoking, which he found helpful. He did not smoke until he was 59-year-old.      Past Medical History:   Diagnosis Date    Anxiety     History of medical problems 01/01/2018    Pericarditis    Kidney stone 01/01/2013    Pericarditis 06/2019         Current Outpatient Medications:     clobetasol (TEMOVATE) 0.05 % ointment, Apply 1 Application topically to the appropriate area as directed 2 (Two) Times a Day., Disp: 30 g, Rfl: 0    famotidine (PEPCID) 20 MG tablet, TAKE 1 TABLET BY MOUTH TWICE A DAY, Disp: 180  tablet, Rfl: 1    losartan (COZAAR) 100 MG tablet, Take 1 tablet by mouth Daily., Disp: 90 tablet, Rfl: 1    Sodium Fluoride 5000 PPM 1.1 % paste, PLEASE SEE ATTACHED FOR DETAILED DIRECTIONS, Disp: , Rfl:     rosuvastatin (CRESTOR) 10 MG tablet, Take 1 tablet by mouth Daily., Disp: 90 tablet, Rfl: 1    No Known Allergies    Review of Systems   Constitutional:  Negative for activity change, appetite change, chills, diaphoresis, fatigue, fever and unexpected weight change.   HENT:  Negative for congestion, dental problem, drooling, ear discharge, ear pain, facial swelling, hearing loss, mouth sores, nosebleeds, postnasal drip, rhinorrhea, sinus pressure, sore throat, tinnitus and trouble swallowing.    Eyes:  Negative for photophobia, pain, discharge, redness, itching and visual disturbance.   Respiratory:  Negative for apnea, cough, choking, chest tightness, shortness of breath and wheezing.    Cardiovascular:  Negative for chest pain, palpitations and leg swelling.        No orthopnea, PND, MARQUIS   Gastrointestinal:  Negative for abdominal pain, blood in stool, constipation, diarrhea, nausea and vomiting.        Dyspepsia controlled by Pepcid   Endocrine: Negative for cold intolerance, heat intolerance, polydipsia and polyuria.   Genitourinary:  Negative for decreased urine volume, dysuria, enuresis, flank pain, frequency, hematuria and urgency.   Musculoskeletal:  Positive for arthralgias, back pain, neck pain and neck stiffness. Negative for gait problem, joint swelling and myalgias.   Skin:  Positive for rash. Negative for color change.        No hair changes, no nail changes   Allergic/Immunologic: Negative for environmental allergies, food allergies and immunocompromised state.   Neurological:  Negative for dizziness, tremors, seizures, syncope, speech difficulty, weakness, light-headedness, numbness and headaches.   Hematological:  Negative for adenopathy. Does not bruise/bleed easily.   Psychiatric/Behavioral:   "Negative for agitation, confusion, decreased concentration, dysphoric mood, sleep disturbance and suicidal ideas. The patient is not nervous/anxious.        Objective   Vitals:    04/09/25 1032   BP: 128/88   BP Location: Left arm   Patient Position: Sitting   Cuff Size: Large Adult   Pulse: 79   Temp: 98.1 °F (36.7 °C)   SpO2: 95%   Weight: 111 kg (244 lb)   Height: 177.8 cm (70\")     Physical Exam  Constitutional:       General: He is not in acute distress.     Appearance: Normal appearance. He is not diaphoretic.   HENT:      Head: Normocephalic and atraumatic.      Right Ear: Tympanic membrane, ear canal and external ear normal.      Left Ear: Tympanic membrane, ear canal and external ear normal.      Nose: Nose normal. No rhinorrhea.      Mouth/Throat:      Mouth: Mucous membranes are moist.      Pharynx: Oropharynx is clear.   Eyes:      General:         Right eye: No discharge.         Left eye: No discharge.      Conjunctiva/sclera: Conjunctivae normal.   Cardiovascular:      Rate and Rhythm: Normal rate and regular rhythm.      Pulses: Normal pulses.      Heart sounds: Normal heart sounds.   Pulmonary:      Effort: Pulmonary effort is normal.      Breath sounds: Normal breath sounds.   Abdominal:      General: Bowel sounds are normal.      Tenderness: There is no abdominal tenderness.   Musculoskeletal:         General: No swelling or tenderness.      Cervical back: Normal range of motion.   Skin:     General: Skin is warm and dry.   Neurological:      General: No focal deficit present.      Mental Status: He is alert and oriented to person, place, and time.   Psychiatric:         Mood and Affect: Mood normal.         Behavior: Behavior normal.         Judgment: Judgment normal.           Results  Laboratory Studies  Total cholesterol was 264. LDL cholesterol was 173.    Imaging  CT scan of lumbar spine showed multilevel degenerative disease, worse at L3-4.       Assessment & Plan   Diagnoses and all orders " for this visit:    1. Physical exam (Primary)  -     CBC (No Diff)  -     Comprehensive Metabolic Panel  -     Lipid Panel  -     TSH    2. Essential hypertension  Assessment & Plan:  His blood pressure is slightly elevated at 128/88 mmHg. He is currently taking losartan. He is advised to monitor his blood pressure at home and call if readings are consistently >135/85.      3. Hypercholesteremia  Assessment & Plan:  Previous blood work showed elevated total cholesterol at 264 mg/dL and LDL at 173 mg/dL. Blood work will be conducted today to reassess cholesterol levels, consider statin therapy if LDL remains elevated.      4. Dyspepsia  Assessment & Plan:  He is taking famotidine daily and finds one tablet sufficient despite the recommendation of two. He is advised to continue with the current dosage if it remains effective.      5. 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 He has previously used nicotine gum, which he found helpful. He is encouraged to use nicotine gum again and consider other smoking cessation aids if needed.      6. Acute pain of left shoulder  Assessment & Plan:  He reports intermittent pain and popping in the left shoulder, possibly due to a partial ligament tear. He is advised to continue yoga and monitor the pain. If the pain worsens or limits motion, further evaluation will be considered. He may take Tylenol as needed for pain.      7. Lumbar radiculopathy  Assessment & Plan:  He has multilevel degenerative disease in the lumbar spine, confirmed by a previous CT scan. The condition is worse at L3-4. He is advised to continue yoga and consider physical therapy if pain persists.      8. Screening for prostate cancer  -     PSA Screen      Assessment & Plan    Health maintenance.  He completed a Cologuard test in October 2024, which was negative. He is advised to consider the shingles vaccine, which is a series of two  doses. Blood work will be conducted today to assess kidney function, liver function, cholesterol levels, and PSA.    Risk Assessment:  Family History   Problem Relation Age of Onset    Heart attack Mother 78    Dementia Mother     Heart disease Mother     Coronary artery disease Father 70        +smoker    Heart disease Father      His Body mass index is 35.01 kg/m². He is advised on lifestyle modifications for improved health.    Prevention:  Health Maintenance   Topic Date Due    Pneumococcal Vaccine 50+ (1 of 2 - PCV) Never done    TDAP/TD VACCINES (1 - Tdap) Never done    ZOSTER VACCINE (1 of 2) Never done    ANNUAL PHYSICAL  04/01/2025    COVID-19 Vaccine (4 - 2024-25 season) 10/01/2025 (Originally 9/1/2024)    INFLUENZA VACCINE  07/01/2025    LIPID PANEL  04/09/2026    COLORECTAL CANCER SCREENING  10/10/2027    HEPATITIS C SCREENING  Completed       Discussed healthy lifestyle choices such as maintaining a balanced diet low in carbohydrates and limiting caffeine and alcohol intake.  Recommended routine exercise for bone strength and cardiovascular health.         Patient or patient representative verbalized consent for the use of Ambient Listening during the visit with  EMILY Kwok for chart documentation. 4/27/2025  12:29 EDT

## 2025-06-15 DIAGNOSIS — I10 ESSENTIAL HYPERTENSION: Chronic | ICD-10-CM

## 2025-06-16 RX ORDER — LOSARTAN POTASSIUM 100 MG/1
100 TABLET ORAL DAILY
Qty: 90 TABLET | Refills: 1 | Status: SHIPPED | OUTPATIENT
Start: 2025-06-16